# Patient Record
Sex: FEMALE | Race: WHITE | NOT HISPANIC OR LATINO | Employment: OTHER | URBAN - METROPOLITAN AREA
[De-identification: names, ages, dates, MRNs, and addresses within clinical notes are randomized per-mention and may not be internally consistent; named-entity substitution may affect disease eponyms.]

---

## 2019-10-03 ENCOUNTER — HOSPITAL ENCOUNTER (EMERGENCY)
Facility: MEDICAL CENTER | Age: 74
End: 2019-10-03
Attending: EMERGENCY MEDICINE
Payer: MEDICARE

## 2019-10-03 VITALS
DIASTOLIC BLOOD PRESSURE: 68 MMHG | HEART RATE: 64 BPM | WEIGHT: 209.88 LBS | TEMPERATURE: 98.2 F | OXYGEN SATURATION: 97 % | HEIGHT: 58 IN | RESPIRATION RATE: 17 BRPM | SYSTOLIC BLOOD PRESSURE: 169 MMHG | BODY MASS INDEX: 44.06 KG/M2

## 2019-10-03 DIAGNOSIS — R21 RASH: ICD-10-CM

## 2019-10-03 PROCEDURE — 700102 HCHG RX REV CODE 250 W/ 637 OVERRIDE(OP): Performed by: EMERGENCY MEDICINE

## 2019-10-03 PROCEDURE — A9270 NON-COVERED ITEM OR SERVICE: HCPCS | Performed by: EMERGENCY MEDICINE

## 2019-10-03 PROCEDURE — 99284 EMERGENCY DEPT VISIT MOD MDM: CPT

## 2019-10-03 RX ORDER — BENZOCAINE/MENTHOL 6 MG-10 MG
LOZENGE MUCOUS MEMBRANE ONCE
Status: COMPLETED | OUTPATIENT
Start: 2019-10-03 | End: 2019-10-03

## 2019-10-03 RX ORDER — SIMVASTATIN 10 MG
10 TABLET ORAL NIGHTLY
Status: SHIPPED | COMMUNITY
End: 2021-11-04 | Stop reason: SDUPTHER

## 2019-10-03 RX ORDER — BENZOCAINE/MENTHOL 6 MG-10 MG
1 LOZENGE MUCOUS MEMBRANE 2 TIMES DAILY
Qty: 1 TUBE | Refills: 0 | Status: SHIPPED | OUTPATIENT
Start: 2019-10-03 | End: 2023-09-24

## 2019-10-03 RX ADMIN — HYDROCORTISONE: 10 CREAM TOPICAL at 22:07

## 2019-10-04 NOTE — ED PROVIDER NOTES
ER Provider Note     Scribed for Louis Quezada M.D. by Laura French. 10/3/2019, 9:28 PM.    Primary Care Provider: James Oshea M.D.  Means of Arrival: Walk-in   History obtained from: Patient  History limited by: None     CHIEF COMPLAINT  Chief Complaint   Patient presents with   • Rash     patient ambulatory to triage states that 25 years ago she had a mastectomy, and lymphectomy, 15 years ago had lymphedema, tonight complains of redness to right arm and some swelling, some redness and swelling noted to area. States that it has some itching to area.        HPI  Madison Beth is a 74 y.o. female who presents to the Emergency Department complaining of right arm rash onset a couple hours ago. Patient states the area is itchy and warm and that she did not want to wake up with swelling the next morning, which prompted her to present to the ED tonight. She reports she had a single mastectomy 25 years ago and she had arm edema 15 years ago as a result; she does not want the same thing to happen again.     REVIEW OF SYSTEMS  See HPI for further details.    PAST MEDICAL HISTORY   has a past medical history of Cancer (HCC) and Hypertension.    SURGICAL HISTORY   has a past surgical history that includes primary c section; mastectomy; hysteroscopy with video operative (8/20/2010); and dilation and curettage (8/20/2010).    SOCIAL HISTORY  Social History     Tobacco Use   • Smoking status: Never Smoker   • Smokeless tobacco: Never Used   Substance Use Topics   • Alcohol use: No   • Drug use: No      Social History     Substance and Sexual Activity   Drug Use No       FAMILY HISTORY  History reviewed. No pertinent family history.    CURRENT MEDICATIONS  Home Medications     Reviewed by Katrina Moore R.N. (Registered Nurse) on 10/03/19 at 2054  Med List Status: Complete   Medication Last Dose Status   Metoprolol Tartrate (LOPRESSOR PO)  Active   Omeprazole (PRILOSEC PO)  Active   simvastatin (ZOCOR) 10 MG  "Tab  Active   triamterene-hctz (MAXZIDE-25/DYAZIDE) 37.5-25 MG TABS  Active                ALLERGIES  Allergies   Allergen Reactions   • Sulfa Drugs        PHYSICAL EXAM  VITAL SIGNS: BP (!) 169/68   Pulse 64   Temp 36.8 °C (98.2 °F) (Temporal)   Resp 17   Ht 1.473 m (4' 10\")   Wt 95.2 kg (209 lb 14.1 oz)   SpO2 97%   BMI 43.86 kg/m²      Constitutional: Alert in no apparent distress.  HENT: No signs of trauma, Bilateral external ears normal, Nose normal.   Eyes: Pupils are equal and reactive, Conjunctiva normal, Non-icteric.   Neck: Normal range of motion  Thorax & Lungs: No respiratory distress  Abdomen: Bowel sounds normal, Soft, No tenderness, No masses, No pulsatile masses. No peritoneal signs.  Skin: Warm, Dry, Right arm with maculopapular rash on dorsum of arm and with some red papules at bottom of arm with excoriation.   Back: No bony tenderness, No CVA tenderness.   Extremities: Intact distal pulses, No edema, No tenderness, No cyanosis.  Musculoskeletal: Good range of motion in all major joints. No major deformities noted.   Neurologic: Alert , Normal motor function, Normal sensory function, No focal deficits noted.   Psychiatric: Affect normal, Judgment normal, Mood normal.     COURSE & MEDICAL DECISION MAKING  Pertinent Labs & Imaging studies reviewed. (See chart for details)    This is a 74 y.o. female that presents for right arm rash onset a couple hours ago.  It appears to be a allergic rash.  There is no evidence of cellulitis.    9:28 PM - Patient seen and examined at bedside. Discussed with patient that I do not believe she has a bacterial infection but her symptoms are more allergy related due to itchiness. I will prescribe her hydrocortisol and give her first dose here. Discussed plan of discharge as outlined below and patient was given the opportunity to ask questions. Patient understands and is comfortable with plan.  Patient will be medicated with Hydrocortisone 1% cream for her " symptoms.     The patient will return for new or worsening symptoms and is stable at the time of discharge.    DISPOSITION:  Patient will be discharged home in stable condition.    FOLLOW UP:  James Oshea M.D.  [address]  [address]  [place] [id number]  [phone]    In 2 days      OUTPATIENT MEDICATIONS:  New Prescriptions    HYDROCORTISONE 1 % CREAM    Apply 1 g to affected area(s) 2 times a day.       FINAL IMPRESSION  1. Rash          Laura JOY (Scribe), am scribing for, and in the presence of, Louis Quezada M.D..    Electronically signed by: Laura French (Scribe), 10/3/2019    ILouis M.D. personally performed the services described in this documentation, as scribed by Laura French in my presence, and it is both accurate and complete.     E    The note accurately reflects work and decisions made by me.  Louis Quezada  10/3/2019  11:11 PM

## 2019-10-04 NOTE — ED NOTES
DC home with written and verbal instructions regarding f/u, activity and RX.  Verbalized understanding, ambulated out.

## 2019-10-04 NOTE — ED TRIAGE NOTES
"Chief Complaint   Patient presents with   • Rash     patient ambulatory to triage states that 25 years ago she had a mastectomy, and lymphectomy, 15 years ago had lymphedema, tonight complains of redness to right arm and some swelling, some redness and swelling noted to area. States that it has some itching to area.      BP (!) 169/68   Pulse 64   Temp 36.8 °C (98.2 °F) (Temporal)   Resp 17   Ht 1.473 m (4' 10\")   Wt 95.2 kg (209 lb 14.1 oz)   SpO2 97%     Patient educated on ed triage process, instructed to notify staff of any new or worsening symptoms. Apologized for wait times and placed back in ed lobby  "

## 2021-01-15 DIAGNOSIS — Z23 NEED FOR VACCINATION: ICD-10-CM

## 2021-11-04 NOTE — TELEPHONE ENCOUNTER
Received request via: Pharmacy    Was the patient seen in the last year in this department? No 11/2/2018 (confirmed in [facility])    Does the patient have an active prescription (recently filled or refills available) for medication(s) requested? No

## 2021-11-08 RX ORDER — SIMVASTATIN 10 MG
10 TABLET ORAL DAILY
Qty: 90 TABLET | Refills: 3 | Status: SHIPPED | OUTPATIENT
Start: 2021-11-08 | End: 2022-06-03 | Stop reason: SDUPTHER

## 2021-12-15 ENCOUNTER — TELEPHONE (OUTPATIENT)
Dept: MEDICAL GROUP | Facility: CLINIC | Age: 76
End: 2021-12-15

## 2021-12-16 NOTE — TELEPHONE ENCOUNTER
Received request via: Pharmacy    Was the patient seen in the last year in this department? No   Pt has not been seen since 11/2018.   Does the patient have an active prescription (recently filled or refills available) for medication(s) requested? No

## 2021-12-17 RX ORDER — TRIAMTERENE AND HYDROCHLOROTHIAZIDE 37.5; 25 MG/1; MG/1
1 TABLET ORAL DAILY
COMMUNITY
Start: 2015-02-03 | End: 2021-12-17 | Stop reason: SDUPTHER

## 2021-12-17 RX ORDER — TRIAMTERENE AND HYDROCHLOROTHIAZIDE 37.5; 25 MG/1; MG/1
1 TABLET ORAL DAILY
Qty: 90 TABLET | Refills: 3 | Status: SHIPPED | OUTPATIENT
Start: 2021-12-17 | End: 2021-12-20 | Stop reason: SDUPTHER

## 2021-12-22 RX ORDER — TRIAMTERENE AND HYDROCHLOROTHIAZIDE 37.5; 25 MG/1; MG/1
1 TABLET ORAL DAILY
Qty: 90 TABLET | Refills: 3 | Status: SHIPPED | OUTPATIENT
Start: 2021-12-22 | End: 2022-06-03 | Stop reason: SDUPTHER

## 2022-06-03 ENCOUNTER — OFFICE VISIT (OUTPATIENT)
Dept: MEDICAL GROUP | Facility: CLINIC | Age: 77
End: 2022-06-03
Payer: MEDICARE

## 2022-06-03 VITALS
SYSTOLIC BLOOD PRESSURE: 124 MMHG | WEIGHT: 192.7 LBS | HEART RATE: 60 BPM | OXYGEN SATURATION: 97 % | BODY MASS INDEX: 40.45 KG/M2 | DIASTOLIC BLOOD PRESSURE: 82 MMHG | HEIGHT: 58 IN | TEMPERATURE: 97.7 F

## 2022-06-03 DIAGNOSIS — Z00.00 ENCOUNTER FOR GENERAL ADULT MEDICAL EXAMINATION WITHOUT ABNORMAL FINDINGS: ICD-10-CM

## 2022-06-03 DIAGNOSIS — K21.9 GASTROESOPHAGEAL REFLUX DISEASE, UNSPECIFIED WHETHER ESOPHAGITIS PRESENT: ICD-10-CM

## 2022-06-03 DIAGNOSIS — I10 HYPERTENSION, UNSPECIFIED TYPE: ICD-10-CM

## 2022-06-03 DIAGNOSIS — N18.30 STAGE 3 CHRONIC KIDNEY DISEASE, UNSPECIFIED WHETHER STAGE 3A OR 3B CKD: ICD-10-CM

## 2022-06-03 PROCEDURE — 99214 OFFICE O/P EST MOD 30 MIN: CPT | Performed by: FAMILY MEDICINE

## 2022-06-03 RX ORDER — TRIAMTERENE AND HYDROCHLOROTHIAZIDE 37.5; 25 MG/1; MG/1
1 TABLET ORAL DAILY
Qty: 90 TABLET | Refills: 3 | Status: SHIPPED | OUTPATIENT
Start: 2022-06-03 | End: 2023-01-01

## 2022-06-03 RX ORDER — METOPROLOL TARTRATE 100 MG/1
100 TABLET ORAL 2 TIMES DAILY
Qty: 180 TABLET | Refills: 3 | Status: SHIPPED | OUTPATIENT
Start: 2022-06-03 | End: 2023-01-01

## 2022-06-03 RX ORDER — SIMVASTATIN 10 MG
10 TABLET ORAL DAILY
Qty: 90 TABLET | Refills: 3 | Status: SHIPPED | OUTPATIENT
Start: 2022-06-03 | End: 2023-01-01

## 2022-06-03 NOTE — ASSESSMENT & PLAN NOTE
We discussed a trial off of omeprazole by tapering down over the course of a week or 2.  If she has recurrent symptoms she may   continue the medication but I would like to know about a.  Otherwise we will be grateful if she can stop this medicine. Discussed risks of long term use.   Is not taking any NSAIDs, aspirin and no alcohol

## 2022-06-03 NOTE — ASSESSMENT & PLAN NOTE
I am recommending labs, but Madison refuses.  I will continue to monitor with her.  Currently she feels good and has no physical complaints.

## 2022-06-03 NOTE — PROGRESS NOTES
"Subjective:     CC: Med refills, HCM, HTN, GERD    HPI:   Madison presents today to discuss the following issues     Problem   Htn (Hypertension)    BP has been under good control. Needs med refills     Gastroesophageal Reflux Disease    Has no reflux symptoms at all now.  She has been taking over-the-counter 20 mg omeprazole for \"100 years\".  Wonders if that is okay to continue as is.     Stage 3 Chronic Kidney Disease (Hcc)    Is staying hydrated.   Refusing further lab work.     Encounter for General Adult Medical Examination Without Abnormal Findings    Does not want further colon cancer or breast cancer screening at this time.     Obesity       Current Outpatient Medications Ordered in Epic   Medication Sig Dispense Refill   • triamterene-hctz (MAXZIDE-25) 37.5-25 MG Tab Take 1 Tablet by mouth every day. 90 Tablet 3   • metoprolol tartrate (LOPRESSOR) 100 MG Tab Take 1 Tablet by mouth 2 times a day. 180 Tablet 3   • simvastatin (ZOCOR) 10 MG Tab Take 1 Tablet by mouth every day. 90 Tablet 3   • Omeprazole (PRILOSEC PO) Take  by mouth.     • hydrocortisone 1 % Cream Apply 1 g to affected area(s) 2 times a day. (Patient not taking: Reported on 6/3/2022) 1 Tube 0     No current Epic-ordered facility-administered medications on file.       Health Maintenance:     ROS:  Gen: no fevers/chills, no changes in weight  Eyes: no changes in vision  ENT: no sore throat, no hearing loss, no bloody nose  Pulm: no sob, no cough  CV: no chest pain, no palpitations  GI: no nausea/vomiting, no diarrhea  : no dysuria  MSk: no myalgias  Skin: no rash  Neuro: no headaches, no numbness/tingling  Heme/Lymph: no easy bruising      Objective:     Exam:  /82 (BP Location: Left arm, Patient Position: Sitting, BP Cuff Size: Adult)   Pulse 60   Temp 36.5 °C (97.7 °F) (Temporal)   Ht 1.473 m (4' 10\")   Wt 87.4 kg (192 lb 11.2 oz)   SpO2 97%   BMI 40.27 kg/m²  Body mass index is 40.27 kg/m².    Gen: Alert and oriented, No apparent " distress.  Neck: Neck is supple without lymphadenopathy.  Lungs: Normal effort, CTA bilaterally, no wheezes, rhonchi, or rales  CV: Regular rate and rhythm. No murmurs, rubs, or gallops.  Ext: No clubbing, cyanosis, edema.          Assessment & Plan:     76 y.o. female with the following -     Problem List Items Addressed This Visit     Encounter for general adult medical examination without abnormal findings     I reviewed pros and cons of the recommended screening evaluations.  Madison politely declines colonoscopy, mammography, DEXA scanning and Shingrix.  She has promised me that she will give it deep thought and get back to me if she changes her mind.           HTN (hypertension)     RF Meds  Of note, she has fairly bradycardic on the metoprolol.  She insists that she is asymptomatic and given that she has been on this medicine with good blood pressure control for so long I will continue to refill.  She is aware of potential issues with this though and will let me know if there are any problems.           Relevant Medications    triamterene-hctz (MAXZIDE-25) 37.5-25 MG Tab    metoprolol tartrate (LOPRESSOR) 100 MG Tab    simvastatin (ZOCOR) 10 MG Tab    Gastroesophageal reflux disease     We discussed a trial off of omeprazole by tapering down over the course of a week or 2.  If she has recurrent symptoms she may   continue the medication but I would like to know about a.  Otherwise we will be grateful if she can stop this medicine. Discussed risks of long term use.   Is not taking any NSAIDs, aspirin and no alcohol           Stage 3 chronic kidney disease (HCC)     I am recommending labs, but Madison refuses.  I will continue to monitor with her.  Currently she feels good and has no physical complaints.                   No follow-ups on file.

## 2022-06-03 NOTE — ASSESSMENT & PLAN NOTE
I reviewed pros and cons of the recommended screening evaluations.  Madison politely declines colonoscopy, mammography, DEXA scanning and Shingrix.  She has promised me that she will give it deep thought and get back to me if she changes her mind.

## 2022-06-03 NOTE — ASSESSMENT & PLAN NOTE
RF Meds  Of note, she has fairly bradycardic on the metoprolol.  She insists that she is asymptomatic and given that she has been on this medicine with good blood pressure control for so long I will continue to refill.  She is aware of potential issues with this though and will let me know if there are any problems.

## 2023-01-01 ENCOUNTER — APPOINTMENT (OUTPATIENT)
Dept: RADIOLOGY | Facility: MEDICAL CENTER | Age: 78
DRG: 217 | End: 2023-01-01
Attending: INTERNAL MEDICINE
Payer: MEDICARE

## 2023-01-01 ENCOUNTER — APPOINTMENT (OUTPATIENT)
Dept: CARDIOLOGY | Facility: MEDICAL CENTER | Age: 78
DRG: 217 | End: 2023-01-01
Attending: EMERGENCY MEDICINE
Payer: MEDICARE

## 2023-01-01 ENCOUNTER — APPOINTMENT (OUTPATIENT)
Dept: CARDIOLOGY | Facility: MEDICAL CENTER | Age: 78
DRG: 217 | End: 2023-01-01
Attending: INTERNAL MEDICINE
Payer: MEDICARE

## 2023-01-01 ENCOUNTER — OFFICE VISIT (OUTPATIENT)
Dept: MEDICAL GROUP | Facility: CLINIC | Age: 78
End: 2023-01-01
Payer: MEDICARE

## 2023-01-01 ENCOUNTER — APPOINTMENT (OUTPATIENT)
Dept: RADIOLOGY | Facility: MEDICAL CENTER | Age: 78
DRG: 217 | End: 2023-01-01
Payer: MEDICARE

## 2023-01-01 ENCOUNTER — HOSPITAL ENCOUNTER (INPATIENT)
Facility: MEDICAL CENTER | Age: 78
LOS: 2 days | DRG: 217 | End: 2023-09-23
Attending: EMERGENCY MEDICINE | Admitting: INTERNAL MEDICINE
Payer: MEDICARE

## 2023-01-01 ENCOUNTER — HOSPITAL ENCOUNTER (OUTPATIENT)
Dept: RADIOLOGY | Facility: MEDICAL CENTER | Age: 78
End: 2023-09-22
Attending: INTERNAL MEDICINE

## 2023-01-01 VITALS — HEART RATE: 54 BPM | OXYGEN SATURATION: 96 % | DIASTOLIC BLOOD PRESSURE: 75 MMHG | SYSTOLIC BLOOD PRESSURE: 138 MMHG

## 2023-01-01 VITALS
HEART RATE: 73 BPM | SYSTOLIC BLOOD PRESSURE: 96 MMHG | BODY MASS INDEX: 40.77 KG/M2 | DIASTOLIC BLOOD PRESSURE: 54 MMHG | WEIGHT: 207.67 LBS | OXYGEN SATURATION: 91 % | HEIGHT: 60 IN | TEMPERATURE: 98.8 F | RESPIRATION RATE: 28 BRPM

## 2023-01-01 DIAGNOSIS — I21.02 ST ELEVATION MYOCARDIAL INFARCTION INVOLVING LEFT ANTERIOR DESCENDING (LAD) CORONARY ARTERY (HCC): ICD-10-CM

## 2023-01-01 DIAGNOSIS — I21.3 ST ELEVATION MYOCARDIAL INFARCTION (STEMI), UNSPECIFIED ARTERY (HCC): ICD-10-CM

## 2023-01-01 DIAGNOSIS — L98.9 SKIN LESION: ICD-10-CM

## 2023-01-01 DIAGNOSIS — I10 HYPERTENSION, UNSPECIFIED TYPE: ICD-10-CM

## 2023-01-01 DIAGNOSIS — K21.9 GASTROESOPHAGEAL REFLUX DISEASE, UNSPECIFIED WHETHER ESOPHAGITIS PRESENT: ICD-10-CM

## 2023-01-01 LAB
ABO + RH BLD: NORMAL
ABO GROUP BLD: NORMAL
ACT BLD: 125 SEC (ref 74–137)
ACT BLD: 137 SEC (ref 74–137)
ACT BLD: 143 SEC (ref 74–137)
ACT BLD: 143 SEC (ref 74–137)
ACT BLD: 149 SEC (ref 74–137)
ACT BLD: 155 SEC (ref 74–137)
ACT BLD: 161 SEC (ref 74–137)
ACT BLD: 167 SEC (ref 74–137)
ACT BLD: 167 SEC (ref 74–137)
ACT BLD: 203 SEC (ref 74–137)
ACT BLD: 239 SEC (ref 74–137)
ACT BLD: 245 SEC (ref 74–137)
ACT BLD: 287 SEC (ref 74–137)
ACT BLD: 305 SEC (ref 74–137)
ACT BLD: 323 SEC (ref 74–137)
ALBUMIN SERPL BCP-MCNC: 2.9 G/DL (ref 3.2–4.9)
ALBUMIN SERPL BCP-MCNC: 3.2 G/DL (ref 3.2–4.9)
ALBUMIN SERPL BCP-MCNC: 3.9 G/DL (ref 3.2–4.9)
ALBUMIN/GLOB SERPL: 1.1 G/DL
ALP SERPL-CCNC: 114 U/L (ref 30–99)
ALP SERPL-CCNC: 144 U/L (ref 30–99)
ALP SERPL-CCNC: 85 U/L (ref 30–99)
ALT SERPL-CCNC: 58 U/L (ref 2–50)
ALT SERPL-CCNC: 68 U/L (ref 2–50)
ALT SERPL-CCNC: 70 U/L (ref 2–50)
AMORPH CRY #/AREA URNS HPF: PRESENT /HPF
ANION GAP SERPL CALC-SCNC: 15 MMOL/L (ref 7–16)
ANION GAP SERPL CALC-SCNC: 16 MMOL/L (ref 7–16)
ANION GAP SERPL CALC-SCNC: 18 MMOL/L (ref 7–16)
ANION GAP SERPL CALC-SCNC: 18 MMOL/L (ref 7–16)
ANION GAP SERPL CALC-SCNC: 19 MMOL/L (ref 7–16)
ANION GAP SERPL CALC-SCNC: 20 MMOL/L (ref 7–16)
ANION GAP SERPL CALC-SCNC: 21 MMOL/L (ref 7–16)
APPEARANCE UR: ABNORMAL
AST SERPL-CCNC: 130 U/L (ref 12–45)
AST SERPL-CCNC: 207 U/L (ref 12–45)
AST SERPL-CCNC: 66 U/L (ref 12–45)
BACTERIA #/AREA URNS HPF: NEGATIVE /HPF
BARCODED ABORH UBTYP: 5100
BARCODED PRD CODE UBPRD: NORMAL
BARCODED UNIT NUM UBUNT: NORMAL
BASE EXCESS BLDA CALC-SCNC: -13 MMOL/L (ref -4–3)
BASE EXCESS BLDA CALC-SCNC: -4 MMOL/L (ref -4–3)
BASE EXCESS BLDA CALC-SCNC: -5 MMOL/L (ref -4–3)
BASE EXCESS BLDA CALC-SCNC: -5 MMOL/L (ref -4–3)
BASE EXCESS BLDA CALC-SCNC: -6 MMOL/L (ref -4–3)
BASE EXCESS BLDV CALC-SCNC: -4 MMOL/L (ref -4–3)
BASOPHILS # BLD AUTO: 0 % (ref 0–1.8)
BASOPHILS # BLD: 0 K/UL (ref 0–0.12)
BILIRUB CONJ SERPL-MCNC: 0.4 MG/DL (ref 0.1–0.5)
BILIRUB CONJ SERPL-MCNC: <0.2 MG/DL (ref 0.1–0.5)
BILIRUB INDIRECT SERPL-MCNC: 1.5 MG/DL (ref 0–1)
BILIRUB INDIRECT SERPL-MCNC: NORMAL MG/DL (ref 0–1)
BILIRUB SERPL-MCNC: 0.9 MG/DL (ref 0.1–1.5)
BILIRUB SERPL-MCNC: 1 MG/DL (ref 0.1–1.5)
BILIRUB SERPL-MCNC: 1 MG/DL (ref 0.1–1.5)
BILIRUB SERPL-MCNC: 1.9 MG/DL (ref 0.1–1.5)
BILIRUB UR QL STRIP.AUTO: ABNORMAL
BLD GP AB SCN SERPL QL: NORMAL
BODY TEMPERATURE: ABNORMAL DEGREES
BUN SERPL-MCNC: 32 MG/DL (ref 8–22)
BUN SERPL-MCNC: 35 MG/DL (ref 8–22)
BUN SERPL-MCNC: 37 MG/DL (ref 8–22)
BUN SERPL-MCNC: 42 MG/DL (ref 8–22)
BUN SERPL-MCNC: 43 MG/DL (ref 8–22)
BUN SERPL-MCNC: 45 MG/DL (ref 8–22)
BUN SERPL-MCNC: 46 MG/DL (ref 8–22)
CA-I BLD ISE-SCNC: 1.19 MMOL/L (ref 1.1–1.3)
CALCIUM ALBUM COR SERPL-MCNC: 8.6 MG/DL (ref 8.5–10.5)
CALCIUM ALBUM COR SERPL-MCNC: 8.9 MG/DL (ref 8.5–10.5)
CALCIUM ALBUM COR SERPL-MCNC: 9.5 MG/DL (ref 8.5–10.5)
CALCIUM SERPL-MCNC: 7.9 MG/DL (ref 8.5–10.5)
CALCIUM SERPL-MCNC: 7.9 MG/DL (ref 8.5–10.5)
CALCIUM SERPL-MCNC: 8 MG/DL (ref 8.5–10.5)
CALCIUM SERPL-MCNC: 8.3 MG/DL (ref 8.5–10.5)
CALCIUM SERPL-MCNC: 9.4 MG/DL (ref 8.5–10.5)
CHLORIDE SERPL-SCNC: 100 MMOL/L (ref 96–112)
CHLORIDE SERPL-SCNC: 100 MMOL/L (ref 96–112)
CHLORIDE SERPL-SCNC: 96 MMOL/L (ref 96–112)
CHLORIDE SERPL-SCNC: 97 MMOL/L (ref 96–112)
CHLORIDE SERPL-SCNC: 97 MMOL/L (ref 96–112)
CHLORIDE SERPL-SCNC: 98 MMOL/L (ref 96–112)
CHLORIDE SERPL-SCNC: 99 MMOL/L (ref 96–112)
CO2 BLDA-SCNC: 16 MMOL/L (ref 20–33)
CO2 BLDA-SCNC: 18 MMOL/L (ref 20–33)
CO2 BLDA-SCNC: 18 MMOL/L (ref 20–33)
CO2 BLDA-SCNC: 19 MMOL/L (ref 20–33)
CO2 BLDA-SCNC: 20 MMOL/L (ref 20–33)
CO2 BLDV-SCNC: 21 MMOL/L (ref 20–33)
CO2 SERPL-SCNC: 15 MMOL/L (ref 20–33)
CO2 SERPL-SCNC: 16 MMOL/L (ref 20–33)
CO2 SERPL-SCNC: 20 MMOL/L (ref 20–33)
COLOR UR: ABNORMAL
COMPONENT R 8504R: NORMAL
CREAT SERPL-MCNC: 1.62 MG/DL (ref 0.5–1.4)
CREAT SERPL-MCNC: 1.75 MG/DL (ref 0.5–1.4)
CREAT SERPL-MCNC: 1.94 MG/DL (ref 0.5–1.4)
CREAT SERPL-MCNC: 1.97 MG/DL (ref 0.5–1.4)
CREAT SERPL-MCNC: 2.4 MG/DL (ref 0.5–1.4)
CREAT SERPL-MCNC: 2.4 MG/DL (ref 0.5–1.4)
CREAT SERPL-MCNC: 2.52 MG/DL (ref 0.5–1.4)
DELSYS IDSYS: ABNORMAL
EKG IMPRESSION: NORMAL
EKG IMPRESSION: NORMAL
EOSINOPHIL # BLD AUTO: 0 K/UL (ref 0–0.51)
EOSINOPHIL NFR BLD: 0 % (ref 0–6.9)
EPI CELLS #/AREA URNS HPF: ABNORMAL /HPF
ERYTHROCYTE [DISTWIDTH] IN BLOOD BY AUTOMATED COUNT: 46.5 FL (ref 35.9–50)
ERYTHROCYTE [DISTWIDTH] IN BLOOD BY AUTOMATED COUNT: 46.6 FL (ref 35.9–50)
ERYTHROCYTE [DISTWIDTH] IN BLOOD BY AUTOMATED COUNT: 46.8 FL (ref 35.9–50)
ERYTHROCYTE [DISTWIDTH] IN BLOOD BY AUTOMATED COUNT: 46.8 FL (ref 35.9–50)
ERYTHROCYTE [DISTWIDTH] IN BLOOD BY AUTOMATED COUNT: 47.5 FL (ref 35.9–50)
ERYTHROCYTE [DISTWIDTH] IN BLOOD BY AUTOMATED COUNT: 49 FL (ref 35.9–50)
FIBRINOGEN PPP-MCNC: 389 MG/DL (ref 215–460)
GFR SERPLBLD CREATININE-BSD FMLA CKD-EPI: 19 ML/MIN/1.73 M 2
GFR SERPLBLD CREATININE-BSD FMLA CKD-EPI: 20 ML/MIN/1.73 M 2
GFR SERPLBLD CREATININE-BSD FMLA CKD-EPI: 20 ML/MIN/1.73 M 2
GFR SERPLBLD CREATININE-BSD FMLA CKD-EPI: 25 ML/MIN/1.73 M 2
GFR SERPLBLD CREATININE-BSD FMLA CKD-EPI: 26 ML/MIN/1.73 M 2
GFR SERPLBLD CREATININE-BSD FMLA CKD-EPI: 29 ML/MIN/1.73 M 2
GFR SERPLBLD CREATININE-BSD FMLA CKD-EPI: 32 ML/MIN/1.73 M 2
GLOBULIN SER CALC-MCNC: 2.6 G/DL (ref 1.9–3.5)
GLOBULIN SER CALC-MCNC: 3 G/DL (ref 1.9–3.5)
GLOBULIN SER CALC-MCNC: 3.5 G/DL (ref 1.9–3.5)
GLUCOSE BLD STRIP.AUTO-MCNC: 200 MG/DL (ref 65–99)
GLUCOSE BLD STRIP.AUTO-MCNC: 214 MG/DL (ref 65–99)
GLUCOSE BLD STRIP.AUTO-MCNC: 243 MG/DL (ref 65–99)
GLUCOSE BLD STRIP.AUTO-MCNC: 288 MG/DL (ref 65–99)
GLUCOSE BLD STRIP.AUTO-MCNC: 300 MG/DL (ref 65–99)
GLUCOSE SERPL-MCNC: 200 MG/DL (ref 65–99)
GLUCOSE SERPL-MCNC: 217 MG/DL (ref 65–99)
GLUCOSE SERPL-MCNC: 222 MG/DL (ref 65–99)
GLUCOSE SERPL-MCNC: 230 MG/DL (ref 65–99)
GLUCOSE SERPL-MCNC: 239 MG/DL (ref 65–99)
GLUCOSE SERPL-MCNC: 255 MG/DL (ref 65–99)
GLUCOSE SERPL-MCNC: 272 MG/DL (ref 65–99)
GLUCOSE UR STRIP.AUTO-MCNC: 100 MG/DL
GRAN CASTS #/AREA URNS LPF: ABNORMAL /LPF
HCO3 BLDA-SCNC: 14.7 MMOL/L (ref 17–25)
HCO3 BLDA-SCNC: 17.2 MMOL/L (ref 17–25)
HCO3 BLDA-SCNC: 17.5 MMOL/L (ref 17–25)
HCO3 BLDA-SCNC: 18.4 MMOL/L (ref 17–25)
HCO3 BLDA-SCNC: 18.9 MMOL/L (ref 17–25)
HCO3 BLDV-SCNC: 20.1 MMOL/L (ref 24–28)
HCT VFR BLD AUTO: 24.9 % (ref 37–47)
HCT VFR BLD AUTO: 25.1 % (ref 37–47)
HCT VFR BLD AUTO: 27 % (ref 37–47)
HCT VFR BLD AUTO: 27.8 % (ref 37–47)
HCT VFR BLD AUTO: 31 % (ref 37–47)
HCT VFR BLD AUTO: 33.8 % (ref 37–47)
HCT VFR BLD AUTO: 38 % (ref 37–47)
HCT VFR BLD AUTO: 44.4 % (ref 37–47)
HGB BLD-MCNC: 10.3 G/DL (ref 12–16)
HGB BLD-MCNC: 11.1 G/DL (ref 12–16)
HGB BLD-MCNC: 12.4 G/DL (ref 12–16)
HGB BLD-MCNC: 14.3 G/DL (ref 12–16)
HGB BLD-MCNC: 8 G/DL (ref 12–16)
HGB BLD-MCNC: 8.3 G/DL (ref 12–16)
HGB BLD-MCNC: 9.2 G/DL (ref 12–16)
HGB BLD-MCNC: 9.5 G/DL (ref 12–16)
HOROWITZ INDEX BLDA+IHG-RTO: 243 MM[HG]
HOROWITZ INDEX BLDA+IHG-RTO: 278 MM[HG]
INR PPP: 1.37 (ref 0.87–1.13)
INR PPP: 1.68 (ref 0.87–1.13)
KETONES UR STRIP.AUTO-MCNC: NEGATIVE MG/DL
LACTATE BLD-SCNC: 3.6 MMOL/L (ref 0.5–2)
LACTATE SERPL-SCNC: 2.4 MMOL/L (ref 0.5–2)
LACTATE SERPL-SCNC: 2.6 MMOL/L (ref 0.5–2)
LACTATE SERPL-SCNC: 2.8 MMOL/L (ref 0.5–2)
LACTATE SERPL-SCNC: 3.2 MMOL/L (ref 0.5–2)
LACTATE SERPL-SCNC: 3.5 MMOL/L (ref 0.5–2)
LACTATE SERPL-SCNC: 4.1 MMOL/L (ref 0.5–2)
LACTATE SERPL-SCNC: 5 MMOL/L (ref 0.5–2)
LACTATE SERPL-SCNC: 5.2 MMOL/L (ref 0.5–2)
LACTATE SERPL-SCNC: 8 MMOL/L (ref 0.5–2)
LDH SERPL L TO P-CCNC: 741 U/L (ref 107–266)
LEUKOCYTE ESTERASE UR QL STRIP.AUTO: NEGATIVE
LPM ILPM: 1 LPM
LPM ILPM: 3 LPM
LV EJECT FRACT  99904: 20
LYMPHOCYTES # BLD AUTO: 1.85 K/UL (ref 1–4.8)
LYMPHOCYTES NFR BLD: 10.4 % (ref 22–41)
MAGNESIUM SERPL-MCNC: 1.6 MG/DL (ref 1.5–2.5)
MAGNESIUM SERPL-MCNC: 2 MG/DL (ref 1.5–2.5)
MAGNESIUM SERPL-MCNC: 2.3 MG/DL (ref 1.5–2.5)
MANUAL DIFF BLD: NORMAL
MCH RBC QN AUTO: 27.3 PG (ref 27–33)
MCH RBC QN AUTO: 27.9 PG (ref 27–33)
MCH RBC QN AUTO: 28 PG (ref 27–33)
MCH RBC QN AUTO: 28.1 PG (ref 27–33)
MCH RBC QN AUTO: 28.1 PG (ref 27–33)
MCH RBC QN AUTO: 29.1 PG (ref 27–33)
MCHC RBC AUTO-ENTMCNC: 32.1 G/DL (ref 32.2–35.5)
MCHC RBC AUTO-ENTMCNC: 32.2 G/DL (ref 32.2–35.5)
MCHC RBC AUTO-ENTMCNC: 32.6 G/DL (ref 32.2–35.5)
MCHC RBC AUTO-ENTMCNC: 32.8 G/DL (ref 32.2–35.5)
MCHC RBC AUTO-ENTMCNC: 33.1 G/DL (ref 32.2–35.5)
MCHC RBC AUTO-ENTMCNC: 34.2 G/DL (ref 32.2–35.5)
MCV RBC AUTO: 85 FL (ref 81.4–97.8)
MCV RBC AUTO: 85 FL (ref 81.4–97.8)
MCV RBC AUTO: 85.1 FL (ref 81.4–97.8)
MCV RBC AUTO: 85.1 FL (ref 81.4–97.8)
MCV RBC AUTO: 85.4 FL (ref 81.4–97.8)
MCV RBC AUTO: 87.4 FL (ref 81.4–97.8)
METAMYELOCYTES NFR BLD MANUAL: 0.9 %
MICRO URNS: ABNORMAL
MONOCYTES # BLD AUTO: 1.09 K/UL (ref 0–0.85)
MONOCYTES NFR BLD AUTO: 6.1 % (ref 0–13.4)
MORPHOLOGY BLD-IMP: NORMAL
NEUTROPHILS # BLD AUTO: 14.7 K/UL (ref 1.82–7.42)
NEUTROPHILS NFR BLD: 81.7 % (ref 44–72)
NEUTS BAND NFR BLD MANUAL: 0.9 % (ref 0–10)
NITRITE UR QL STRIP.AUTO: POSITIVE
NRBC # BLD AUTO: 0.02 K/UL
NRBC BLD-RTO: 0.1 /100 WBC (ref 0–0.2)
NT-PROBNP SERPL IA-MCNC: ABNORMAL PG/ML (ref 0–125)
NT-PROBNP SERPL IA-MCNC: ABNORMAL PG/ML (ref 0–125)
O2/TOTAL GAS SETTING VFR VENT: 28 %
O2/TOTAL GAS SETTING VFR VENT: 32 %
PCO2 BLDA: 24 MMHG (ref 26–37)
PCO2 BLDA: 26.5 MMHG (ref 26–37)
PCO2 BLDA: 29.3 MMHG (ref 26–37)
PCO2 BLDA: 29.9 MMHG (ref 26–37)
PCO2 BLDA: 40.7 MMHG (ref 26–37)
PCO2 BLDV: 34.6 MMHG (ref 41–51)
PCO2 TEMP ADJ BLDA: 24 MMHG (ref 26–37)
PCO2 TEMP ADJ BLDA: 26.4 MMHG (ref 26–37)
PCO2 TEMP ADJ BLDA: 29.8 MMHG (ref 26–37)
PCO2 TEMP ADJ BLDA: 30.4 MMHG (ref 26–37)
PCO2 TEMP ADJ BLDV: 34.5 MMHG (ref 41–51)
PH BLDA: 7.17 [PH] (ref 7.4–7.5)
PH BLDA: 7.41 [PH] (ref 7.4–7.5)
PH BLDA: 7.41 [PH] (ref 7.4–7.5)
PH BLDA: 7.43 [PH] (ref 7.4–7.5)
PH BLDA: 7.46 [PH] (ref 7.4–7.5)
PH BLDV: 7.37 [PH] (ref 7.31–7.45)
PH TEMP ADJ BLDA: 7.4 [PH] (ref 7.4–7.5)
PH TEMP ADJ BLDA: 7.4 [PH] (ref 7.4–7.5)
PH TEMP ADJ BLDA: 7.43 [PH] (ref 7.4–7.5)
PH TEMP ADJ BLDA: 7.46 [PH] (ref 7.4–7.5)
PH TEMP ADJ BLDV: 7.37 [PH] (ref 7.31–7.45)
PH UR STRIP.AUTO: 5.5 [PH] (ref 5–8)
PLATELET # BLD AUTO: 141 K/UL (ref 164–446)
PLATELET # BLD AUTO: 152 K/UL (ref 164–446)
PLATELET # BLD AUTO: 161 K/UL (ref 164–446)
PLATELET # BLD AUTO: 244 K/UL (ref 164–446)
PLATELET # BLD AUTO: 249 K/UL (ref 164–446)
PLATELET # BLD AUTO: 301 K/UL (ref 164–446)
PLATELET BLD QL SMEAR: NORMAL
PMV BLD AUTO: 10.6 FL (ref 9–12.9)
PMV BLD AUTO: 10.8 FL (ref 9–12.9)
PMV BLD AUTO: 11 FL (ref 9–12.9)
PMV BLD AUTO: 11 FL (ref 9–12.9)
PMV BLD AUTO: 11.1 FL (ref 9–12.9)
PMV BLD AUTO: 11.4 FL (ref 9–12.9)
PO2 BLDA: 119 MMHG (ref 64–87)
PO2 BLDA: 68 MMHG (ref 64–87)
PO2 BLDA: 76 MMHG (ref 64–87)
PO2 BLDA: 87 MMHG (ref 64–87)
PO2 BLDA: 89 MMHG (ref 64–87)
PO2 BLDV: 28 MMHG (ref 25–40)
PO2 TEMP ADJ BLDA: 70 MMHG (ref 64–87)
PO2 TEMP ADJ BLDA: 76 MMHG (ref 64–87)
PO2 TEMP ADJ BLDA: 87 MMHG (ref 64–87)
PO2 TEMP ADJ BLDA: 91 MMHG (ref 64–87)
PO2 TEMP ADJ BLDV: 27 MMHG (ref 25–40)
POTASSIUM BLD-SCNC: 4.3 MMOL/L (ref 3.6–5.5)
POTASSIUM SERPL-SCNC: 3.8 MMOL/L (ref 3.6–5.5)
POTASSIUM SERPL-SCNC: 3.9 MMOL/L (ref 3.6–5.5)
POTASSIUM SERPL-SCNC: 4.1 MMOL/L (ref 3.6–5.5)
POTASSIUM SERPL-SCNC: 4.3 MMOL/L (ref 3.6–5.5)
POTASSIUM SERPL-SCNC: 4.5 MMOL/L (ref 3.6–5.5)
PROCALCITONIN SERPL-MCNC: 0.32 NG/ML
PRODUCT TYPE UPROD: NORMAL
PROT SERPL-MCNC: 5.5 G/DL (ref 6–8.2)
PROT SERPL-MCNC: 6.2 G/DL (ref 6–8.2)
PROT SERPL-MCNC: 7.4 G/DL (ref 6–8.2)
PROT UR QL STRIP: 100 MG/DL
PROTHROMBIN TIME: 17 SEC (ref 12–14.6)
PROTHROMBIN TIME: 20 SEC (ref 12–14.6)
RBC # BLD AUTO: 2.93 M/UL (ref 4.2–5.4)
RBC # BLD AUTO: 2.95 M/UL (ref 4.2–5.4)
RBC # BLD AUTO: 3.27 M/UL (ref 4.2–5.4)
RBC # BLD AUTO: 3.97 M/UL (ref 4.2–5.4)
RBC # BLD AUTO: 4.45 M/UL (ref 4.2–5.4)
RBC # BLD AUTO: 5.08 M/UL (ref 4.2–5.4)
RBC # URNS HPF: ABNORMAL /HPF
RBC BLD AUTO: NORMAL
RBC UR QL AUTO: ABNORMAL
RH BLD: NORMAL
SAO2 % BLDA: 94 % (ref 93–99)
SAO2 % BLDA: 96 % (ref 93–99)
SAO2 % BLDA: 97 % (ref 93–99)
SAO2 % BLDV: 51 %
SODIUM BLD-SCNC: 134 MMOL/L (ref 135–145)
SODIUM SERPL-SCNC: 130 MMOL/L (ref 135–145)
SODIUM SERPL-SCNC: 130 MMOL/L (ref 135–145)
SODIUM SERPL-SCNC: 131 MMOL/L (ref 135–145)
SODIUM SERPL-SCNC: 133 MMOL/L (ref 135–145)
SODIUM SERPL-SCNC: 133 MMOL/L (ref 135–145)
SODIUM SERPL-SCNC: 135 MMOL/L (ref 135–145)
SODIUM SERPL-SCNC: 135 MMOL/L (ref 135–145)
SP GR UR STRIP.AUTO: 1.02
SPECIMEN DRAWN FROM PATIENT: ABNORMAL
TRANS CELLS #/AREA URNS HPF: ABNORMAL /HPF
TROPONIN T SERPL-MCNC: 725 NG/L (ref 6–19)
UNIT STATUS USTAT: NORMAL
UROBILINOGEN UR STRIP.AUTO-MCNC: 0.2 MG/DL
WBC # BLD AUTO: 14.3 K/UL (ref 4.8–10.8)
WBC # BLD AUTO: 16 K/UL (ref 4.8–10.8)
WBC # BLD AUTO: 17.6 K/UL (ref 4.8–10.8)
WBC # BLD AUTO: 17.8 K/UL (ref 4.8–10.8)
WBC # BLD AUTO: 19.5 K/UL (ref 4.8–10.8)
WBC # BLD AUTO: 21.2 K/UL (ref 4.8–10.8)
WBC #/AREA URNS HPF: ABNORMAL /HPF

## 2023-01-01 PROCEDURE — 30233N1 TRANSFUSION OF NONAUTOLOGOUS RED BLOOD CELLS INTO PERIPHERAL VEIN, PERCUTANEOUS APPROACH: ICD-10-PCS | Performed by: INTERNAL MEDICINE

## 2023-01-01 PROCEDURE — 83605 ASSAY OF LACTIC ACID: CPT | Mod: 91

## 2023-01-01 PROCEDURE — 99291 CRITICAL CARE FIRST HOUR: CPT | Performed by: INTERNAL MEDICINE

## 2023-01-01 PROCEDURE — 80048 BASIC METABOLIC PNL TOTAL CA: CPT

## 2023-01-01 PROCEDURE — 74176 CT ABD & PELVIS W/O CONTRAST: CPT

## 2023-01-01 PROCEDURE — 92978 ENDOLUMINL IVUS OCT C 1ST: CPT | Mod: 26,LD | Performed by: INTERNAL MEDICINE

## 2023-01-01 PROCEDURE — 3078F DIAST BP <80 MM HG: CPT | Performed by: STUDENT IN AN ORGANIZED HEALTH CARE EDUCATION/TRAINING PROGRAM

## 2023-01-01 PROCEDURE — 82803 BLOOD GASES ANY COMBINATION: CPT

## 2023-01-01 PROCEDURE — 36415 COLL VENOUS BLD VENIPUNCTURE: CPT

## 2023-01-01 PROCEDURE — 85347 COAGULATION TIME ACTIVATED: CPT | Mod: 91

## 2023-01-01 PROCEDURE — 700105 HCHG RX REV CODE 258: Performed by: INTERNAL MEDICINE

## 2023-01-01 PROCEDURE — 36620 INSERTION CATHETER ARTERY: CPT | Performed by: INTERNAL MEDICINE

## 2023-01-01 PROCEDURE — B241ZZ3 ULTRASONOGRAPHY OF MULTIPLE CORONARY ARTERIES, INTRAVASCULAR: ICD-10-PCS | Performed by: INTERNAL MEDICINE

## 2023-01-01 PROCEDURE — 85384 FIBRINOGEN ACTIVITY: CPT

## 2023-01-01 PROCEDURE — 85025 COMPLETE CBC W/AUTO DIFF WBC: CPT

## 2023-01-01 PROCEDURE — 93005 ELECTROCARDIOGRAM TRACING: CPT | Performed by: INTERNAL MEDICINE

## 2023-01-01 PROCEDURE — 99291 CRITICAL CARE FIRST HOUR: CPT | Mod: 25 | Performed by: INTERNAL MEDICINE

## 2023-01-01 PROCEDURE — 33993 REPOSG PERQ R/L HRT VAD: CPT

## 2023-01-01 PROCEDURE — 86900 BLOOD TYPING SEROLOGIC ABO: CPT

## 2023-01-01 PROCEDURE — 71045 X-RAY EXAM CHEST 1 VIEW: CPT

## 2023-01-01 PROCEDURE — 93308 TTE F-UP OR LMTD: CPT

## 2023-01-01 PROCEDURE — 83735 ASSAY OF MAGNESIUM: CPT

## 2023-01-01 PROCEDURE — 700105 HCHG RX REV CODE 258

## 2023-01-01 PROCEDURE — 02HA3RJ INSERTION OF SHORT-TERM EXTERNAL HEART ASSIST SYSTEM INTO HEART, INTRAOPERATIVE, PERCUTANEOUS APPROACH: ICD-10-PCS | Performed by: INTERNAL MEDICINE

## 2023-01-01 PROCEDURE — 93460 R&L HRT ART/VENTRICLE ANGIO: CPT | Mod: 26,59 | Performed by: INTERNAL MEDICINE

## 2023-01-01 PROCEDURE — 86901 BLOOD TYPING SEROLOGIC RH(D): CPT

## 2023-01-01 PROCEDURE — 027034Z DILATION OF CORONARY ARTERY, ONE ARTERY WITH DRUG-ELUTING INTRALUMINAL DEVICE, PERCUTANEOUS APPROACH: ICD-10-PCS | Performed by: INTERNAL MEDICINE

## 2023-01-01 PROCEDURE — 700111 HCHG RX REV CODE 636 W/ 250 OVERRIDE (IP): Mod: JZ | Performed by: STUDENT IN AN ORGANIZED HEALTH CARE EDUCATION/TRAINING PROGRAM

## 2023-01-01 PROCEDURE — 700102 HCHG RX REV CODE 250 W/ 637 OVERRIDE(OP)

## 2023-01-01 PROCEDURE — 700102 HCHG RX REV CODE 250 W/ 637 OVERRIDE(OP): Performed by: INTERNAL MEDICINE

## 2023-01-01 PROCEDURE — 80053 COMPREHEN METABOLIC PANEL: CPT

## 2023-01-01 PROCEDURE — 3075F SYST BP GE 130 - 139MM HG: CPT | Performed by: STUDENT IN AN ORGANIZED HEALTH CARE EDUCATION/TRAINING PROGRAM

## 2023-01-01 PROCEDURE — 84295 ASSAY OF SERUM SODIUM: CPT

## 2023-01-01 PROCEDURE — 93010 ELECTROCARDIOGRAM REPORT: CPT | Performed by: INTERNAL MEDICINE

## 2023-01-01 PROCEDURE — 82962 GLUCOSE BLOOD TEST: CPT | Mod: 91

## 2023-01-01 PROCEDURE — 5A0221D ASSISTANCE WITH CARDIAC OUTPUT USING IMPELLER PUMP, CONTINUOUS: ICD-10-PCS | Performed by: INTERNAL MEDICINE

## 2023-01-01 PROCEDURE — 99292 CRITICAL CARE ADDL 30 MIN: CPT | Performed by: INTERNAL MEDICINE

## 2023-01-01 PROCEDURE — 85027 COMPLETE CBC AUTOMATED: CPT

## 2023-01-01 PROCEDURE — 83605 ASSAY OF LACTIC ACID: CPT

## 2023-01-01 PROCEDURE — 99152 MOD SED SAME PHYS/QHP 5/>YRS: CPT | Performed by: INTERNAL MEDICINE

## 2023-01-01 PROCEDURE — 03HY32Z INSERTION OF MONITORING DEVICE INTO UPPER ARTERY, PERCUTANEOUS APPROACH: ICD-10-PCS | Performed by: INTERNAL MEDICINE

## 2023-01-01 PROCEDURE — 83880 ASSAY OF NATRIURETIC PEPTIDE: CPT

## 2023-01-01 PROCEDURE — 86850 RBC ANTIBODY SCREEN: CPT

## 2023-01-01 PROCEDURE — 02F03ZZ FRAGMENTATION IN CORONARY ARTERY, ONE ARTERY, PERCUTANEOUS APPROACH: ICD-10-PCS | Performed by: INTERNAL MEDICINE

## 2023-01-01 PROCEDURE — 82248 BILIRUBIN DIRECT: CPT

## 2023-01-01 PROCEDURE — 82803 BLOOD GASES ANY COMBINATION: CPT | Mod: 91

## 2023-01-01 PROCEDURE — 33990 INSJ PERQ VAD L HRT ARTERIAL: CPT | Performed by: INTERNAL MEDICINE

## 2023-01-01 PROCEDURE — 84132 ASSAY OF SERUM POTASSIUM: CPT

## 2023-01-01 PROCEDURE — 92920 PRQ TRLUML C ANGIOP 1ART&/BR: CPT | Mod: 59,LC | Performed by: INTERNAL MEDICINE

## 2023-01-01 PROCEDURE — 85014 HEMATOCRIT: CPT | Mod: 91

## 2023-01-01 PROCEDURE — 82330 ASSAY OF CALCIUM: CPT

## 2023-01-01 PROCEDURE — 93308 TTE F-UP OR LMTD: CPT | Mod: 26 | Performed by: INTERNAL MEDICINE

## 2023-01-01 PROCEDURE — 36430 TRANSFUSION BLD/BLD COMPNT: CPT

## 2023-01-01 PROCEDURE — 80048 BASIC METABOLIC PNL TOTAL CA: CPT | Mod: 91

## 2023-01-01 PROCEDURE — 700101 HCHG RX REV CODE 250: Performed by: INTERNAL MEDICINE

## 2023-01-01 PROCEDURE — A9270 NON-COVERED ITEM OR SERVICE: HCPCS | Performed by: INTERNAL MEDICINE

## 2023-01-01 PROCEDURE — 82247 BILIRUBIN TOTAL: CPT

## 2023-01-01 PROCEDURE — 700111 HCHG RX REV CODE 636 W/ 250 OVERRIDE (IP)

## 2023-01-01 PROCEDURE — 84145 PROCALCITONIN (PCT): CPT

## 2023-01-01 PROCEDURE — 84484 ASSAY OF TROPONIN QUANT: CPT

## 2023-01-01 PROCEDURE — 37799 UNLISTED PX VASCULAR SURGERY: CPT

## 2023-01-01 PROCEDURE — 700111 HCHG RX REV CODE 636 W/ 250 OVERRIDE (IP): Performed by: INTERNAL MEDICINE

## 2023-01-01 PROCEDURE — 85018 HEMOGLOBIN: CPT | Mod: 91

## 2023-01-01 PROCEDURE — 99153 MOD SED SAME PHYS/QHP EA: CPT

## 2023-01-01 PROCEDURE — 4A023N8 MEASUREMENT OF CARDIAC SAMPLING AND PRESSURE, BILATERAL, PERCUTANEOUS APPROACH: ICD-10-PCS | Performed by: INTERNAL MEDICINE

## 2023-01-01 PROCEDURE — 85610 PROTHROMBIN TIME: CPT

## 2023-01-01 PROCEDURE — 99213 OFFICE O/P EST LOW 20 MIN: CPT | Mod: GE | Performed by: STUDENT IN AN ORGANIZED HEALTH CARE EDUCATION/TRAINING PROGRAM

## 2023-01-01 PROCEDURE — 93010 ELECTROCARDIOGRAM REPORT: CPT | Mod: 59 | Performed by: INTERNAL MEDICINE

## 2023-01-01 PROCEDURE — B2111ZZ FLUOROSCOPY OF MULTIPLE CORONARY ARTERIES USING LOW OSMOLAR CONTRAST: ICD-10-PCS | Performed by: INTERNAL MEDICINE

## 2023-01-01 PROCEDURE — 85007 BL SMEAR W/DIFF WBC COUNT: CPT

## 2023-01-01 PROCEDURE — 99291 CRITICAL CARE FIRST HOUR: CPT

## 2023-01-01 PROCEDURE — 770022 HCHG ROOM/CARE - ICU (200)

## 2023-01-01 PROCEDURE — A9270 NON-COVERED ITEM OR SERVICE: HCPCS

## 2023-01-01 PROCEDURE — B2151ZZ FLUOROSCOPY OF LEFT HEART USING LOW OSMOLAR CONTRAST: ICD-10-PCS | Performed by: INTERNAL MEDICINE

## 2023-01-01 PROCEDURE — 700101 HCHG RX REV CODE 250

## 2023-01-01 PROCEDURE — P9016 RBC LEUKOCYTES REDUCED: HCPCS

## 2023-01-01 PROCEDURE — 93325 DOPPLER ECHO COLOR FLOW MAPG: CPT

## 2023-01-01 PROCEDURE — 92979 ENDOLUMINL IVUS OCT C EA: CPT

## 2023-01-01 PROCEDURE — 83615 LACTATE (LD) (LDH) ENZYME: CPT

## 2023-01-01 PROCEDURE — 02WAXRZ REVISION OF SHORT-TERM EXTERNAL HEART ASSIST SYSTEM IN HEART, EXTERNAL APPROACH: ICD-10-PCS | Performed by: INTERNAL MEDICINE

## 2023-01-01 PROCEDURE — 82962 GLUCOSE BLOOD TEST: CPT

## 2023-01-01 PROCEDURE — 36620 INSERTION CATHETER ARTERY: CPT

## 2023-01-01 PROCEDURE — 92928 PRQ TCAT PLMT NTRAC ST 1 LES: CPT | Mod: LD | Performed by: INTERNAL MEDICINE

## 2023-01-01 PROCEDURE — 86923 COMPATIBILITY TEST ELECTRIC: CPT

## 2023-01-01 PROCEDURE — 700117 HCHG RX CONTRAST REV CODE 255: Performed by: INTERNAL MEDICINE

## 2023-01-01 PROCEDURE — 700105 HCHG RX REV CODE 258: Performed by: EMERGENCY MEDICINE

## 2023-01-01 PROCEDURE — 33993 REPOSG PERQ R/L HRT VAD: CPT | Performed by: INTERNAL MEDICINE

## 2023-01-01 PROCEDURE — C9113 INJ PANTOPRAZOLE SODIUM, VIA: HCPCS | Performed by: INTERNAL MEDICINE

## 2023-01-01 PROCEDURE — 700111 HCHG RX REV CODE 636 W/ 250 OVERRIDE (IP): Mod: JZ | Performed by: INTERNAL MEDICINE

## 2023-01-01 PROCEDURE — 81001 URINALYSIS AUTO W/SCOPE: CPT

## 2023-01-01 RX ORDER — HYDROMORPHONE HYDROCHLORIDE 2 MG/ML
4 INJECTION, SOLUTION INTRAMUSCULAR; INTRAVENOUS; SUBCUTANEOUS
Status: DISCONTINUED | OUTPATIENT
Start: 2023-01-01 | End: 2023-01-01 | Stop reason: HOSPADM

## 2023-01-01 RX ORDER — ATROPINE SULFATE 10 MG/ML
2 SOLUTION/ DROPS OPHTHALMIC EVERY 4 HOURS PRN
Status: DISCONTINUED | OUTPATIENT
Start: 2023-01-01 | End: 2023-01-01 | Stop reason: HOSPADM

## 2023-01-01 RX ORDER — SODIUM CHLORIDE, SODIUM LACTATE, POTASSIUM CHLORIDE, AND CALCIUM CHLORIDE .6; .31; .03; .02 G/100ML; G/100ML; G/100ML; G/100ML
500 INJECTION, SOLUTION INTRAVENOUS ONCE
Status: COMPLETED | OUTPATIENT
Start: 2023-01-01 | End: 2023-01-01

## 2023-01-01 RX ORDER — ONDANSETRON 2 MG/ML
4 INJECTION INTRAMUSCULAR; INTRAVENOUS ONCE
Status: COMPLETED | OUTPATIENT
Start: 2023-01-01 | End: 2023-01-01

## 2023-01-01 RX ORDER — NOREPINEPHRINE BITARTRATE 0.03 MG/ML
INJECTION, SOLUTION INTRAVENOUS
Status: ACTIVE
Start: 2023-01-01 | End: 2023-01-01

## 2023-01-01 RX ORDER — CLOPIDOGREL BISULFATE 75 MG/1
75 TABLET ORAL DAILY
Status: DISCONTINUED | OUTPATIENT
Start: 2023-01-01 | End: 2023-01-01

## 2023-01-01 RX ORDER — HEPARIN SODIUM 1000 [USP'U]/ML
INJECTION, SOLUTION INTRAVENOUS; SUBCUTANEOUS
Status: COMPLETED
Start: 2023-01-01 | End: 2023-01-01

## 2023-01-01 RX ORDER — PANTOPRAZOLE SODIUM 40 MG/10ML
40 INJECTION, POWDER, LYOPHILIZED, FOR SOLUTION INTRAVENOUS 2 TIMES DAILY
Status: DISCONTINUED | OUTPATIENT
Start: 2023-01-01 | End: 2023-01-01

## 2023-01-01 RX ORDER — SODIUM CHLORIDE 9 MG/ML
INJECTION, SOLUTION INTRAVENOUS CONTINUOUS
Status: ACTIVE | OUTPATIENT
Start: 2023-01-01 | End: 2023-01-01

## 2023-01-01 RX ORDER — ATORVASTATIN CALCIUM 40 MG/1
40 TABLET, FILM COATED ORAL EVERY EVENING
Status: DISCONTINUED | OUTPATIENT
Start: 2023-01-01 | End: 2023-01-01

## 2023-01-01 RX ORDER — METOPROLOL TARTRATE 100 MG/1
TABLET ORAL
Qty: 180 TABLET | Refills: 0 | Status: SHIPPED | OUTPATIENT
Start: 2023-01-01 | End: 2023-01-01

## 2023-01-01 RX ORDER — METOPROLOL TARTRATE 100 MG/1
TABLET ORAL
Qty: 180 TABLET | Refills: 3 | Status: SHIPPED | OUTPATIENT
Start: 2023-01-01 | End: 2023-09-24

## 2023-01-01 RX ORDER — ONDANSETRON 2 MG/ML
4 INJECTION INTRAMUSCULAR; INTRAVENOUS EVERY 4 HOURS PRN
Status: DISCONTINUED | OUTPATIENT
Start: 2023-01-01 | End: 2023-01-01

## 2023-01-01 RX ORDER — ASPIRIN 81 MG/1
81 TABLET ORAL DAILY
Status: DISCONTINUED | OUTPATIENT
Start: 2023-01-01 | End: 2023-01-01

## 2023-01-01 RX ORDER — CLOPIDOGREL BISULFATE 75 MG/1
300 TABLET ORAL ONCE
Status: DISCONTINUED | OUTPATIENT
Start: 2023-01-01 | End: 2023-01-01

## 2023-01-01 RX ORDER — PHENYLEPHRINE HCL IN 0.9% NACL 0.5 MG/5ML
SYRINGE (ML) INTRAVENOUS
Status: COMPLETED
Start: 2023-01-01 | End: 2023-01-01

## 2023-01-01 RX ORDER — DEXTROSE MONOHYDRATE 25 G/50ML
25 INJECTION, SOLUTION INTRAVENOUS
Status: DISCONTINUED | OUTPATIENT
Start: 2023-01-01 | End: 2023-01-01

## 2023-01-01 RX ORDER — NOREPINEPHRINE BITARTRATE 1 MG/ML
INJECTION, SOLUTION INTRAVENOUS
Status: COMPLETED
Start: 2023-01-01 | End: 2023-01-01

## 2023-01-01 RX ORDER — DEXMEDETOMIDINE HYDROCHLORIDE 4 UG/ML
.1-1.5 INJECTION, SOLUTION INTRAVENOUS CONTINUOUS
Status: DISCONTINUED | OUTPATIENT
Start: 2023-01-01 | End: 2023-01-01 | Stop reason: HOSPADM

## 2023-01-01 RX ORDER — ONDANSETRON 2 MG/ML
8 INJECTION INTRAMUSCULAR; INTRAVENOUS EVERY 8 HOURS PRN
Status: DISCONTINUED | OUTPATIENT
Start: 2023-01-01 | End: 2023-01-01 | Stop reason: HOSPADM

## 2023-01-01 RX ORDER — SODIUM CHLORIDE 9 MG/ML
1000 INJECTION, SOLUTION INTRAVENOUS ONCE
Status: COMPLETED | OUTPATIENT
Start: 2023-01-01 | End: 2023-01-01

## 2023-01-01 RX ORDER — HYDROMORPHONE HYDROCHLORIDE 2 MG/ML
2 INJECTION, SOLUTION INTRAMUSCULAR; INTRAVENOUS; SUBCUTANEOUS
Status: DISCONTINUED | OUTPATIENT
Start: 2023-01-01 | End: 2023-01-01 | Stop reason: HOSPADM

## 2023-01-01 RX ORDER — HEPARIN SODIUM 200 [USP'U]/100ML
INJECTION, SOLUTION INTRAVENOUS
Status: COMPLETED
Start: 2023-01-01 | End: 2023-01-01

## 2023-01-01 RX ORDER — GLYCOPYRROLATE 1 MG/1
1 TABLET ORAL 3 TIMES DAILY PRN
Status: DISCONTINUED | OUTPATIENT
Start: 2023-01-01 | End: 2023-01-01 | Stop reason: HOSPADM

## 2023-01-01 RX ORDER — SODIUM CHLORIDE 9 MG/ML
INJECTION, SOLUTION INTRAVENOUS CONTINUOUS
Status: CANCELLED | OUTPATIENT
Start: 2023-01-01

## 2023-01-01 RX ORDER — ONDANSETRON 4 MG/1
8 TABLET, ORALLY DISINTEGRATING ORAL EVERY 8 HOURS PRN
Status: DISCONTINUED | OUTPATIENT
Start: 2023-01-01 | End: 2023-01-01 | Stop reason: HOSPADM

## 2023-01-01 RX ORDER — NOREPINEPHRINE BITARTRATE 0.03 MG/ML
0-1 INJECTION, SOLUTION INTRAVENOUS CONTINUOUS
Status: DISCONTINUED | OUTPATIENT
Start: 2023-01-01 | End: 2023-01-01

## 2023-01-01 RX ORDER — LORAZEPAM 0.5 MG/1
0.5 TABLET ORAL EVERY 6 HOURS PRN
Status: DISCONTINUED | OUTPATIENT
Start: 2023-01-01 | End: 2023-01-01 | Stop reason: HOSPADM

## 2023-01-01 RX ORDER — LORAZEPAM 2 MG/ML
1 CONCENTRATE ORAL
Status: DISCONTINUED | OUTPATIENT
Start: 2023-01-01 | End: 2023-01-01 | Stop reason: HOSPADM

## 2023-01-01 RX ORDER — LORAZEPAM 2 MG/ML
1 INJECTION INTRAMUSCULAR
Status: DISCONTINUED | OUTPATIENT
Start: 2023-01-01 | End: 2023-01-01 | Stop reason: HOSPADM

## 2023-01-01 RX ORDER — MAGNESIUM SULFATE HEPTAHYDRATE 40 MG/ML
4 INJECTION, SOLUTION INTRAVENOUS ONCE
Status: COMPLETED | OUTPATIENT
Start: 2023-01-01 | End: 2023-01-01

## 2023-01-01 RX ORDER — LIDOCAINE HYDROCHLORIDE 20 MG/ML
INJECTION, SOLUTION INFILTRATION; PERINEURAL
Status: COMPLETED
Start: 2023-01-01 | End: 2023-01-01

## 2023-01-01 RX ORDER — DIGOXIN 0.25 MG/ML
250 INJECTION INTRAMUSCULAR; INTRAVENOUS EVERY 6 HOURS
Status: COMPLETED | OUTPATIENT
Start: 2023-01-01 | End: 2023-01-01

## 2023-01-01 RX ORDER — SIMVASTATIN 10 MG
10 TABLET ORAL DAILY
Qty: 90 TABLET | Refills: 0 | Status: SHIPPED | OUTPATIENT
Start: 2023-01-01 | End: 2023-09-24

## 2023-01-01 RX ORDER — VERAPAMIL HYDROCHLORIDE 2.5 MG/ML
INJECTION, SOLUTION INTRAVENOUS
Status: COMPLETED
Start: 2023-01-01 | End: 2023-01-01

## 2023-01-01 RX ORDER — ACETAMINOPHEN 650 MG/1
650 SUPPOSITORY RECTAL EVERY 4 HOURS PRN
Status: DISCONTINUED | OUTPATIENT
Start: 2023-01-01 | End: 2023-01-01 | Stop reason: HOSPADM

## 2023-01-01 RX ORDER — TRIAMTERENE AND HYDROCHLOROTHIAZIDE 37.5; 25 MG/1; MG/1
1 TABLET ORAL DAILY
Qty: 90 TABLET | Refills: 3 | Status: SHIPPED | OUTPATIENT
Start: 2023-01-01 | End: 2023-09-24

## 2023-01-01 RX ORDER — ACETAMINOPHEN 325 MG/1
650 TABLET ORAL EVERY 4 HOURS PRN
Status: DISCONTINUED | OUTPATIENT
Start: 2023-01-01 | End: 2023-01-01 | Stop reason: HOSPADM

## 2023-01-01 RX ORDER — NITROGLYCERIN 20 MG/100ML
0-200 INJECTION INTRAVENOUS CONTINUOUS
Status: DISCONTINUED | OUTPATIENT
Start: 2023-01-01 | End: 2023-01-01

## 2023-01-01 RX ORDER — OXYCODONE HYDROCHLORIDE 5 MG/1
2.5 TABLET ORAL EVERY 4 HOURS PRN
Status: DISCONTINUED | OUTPATIENT
Start: 2023-01-01 | End: 2023-01-01 | Stop reason: HOSPADM

## 2023-01-01 RX ORDER — SCOLOPAMINE TRANSDERMAL SYSTEM 1 MG/1
1 PATCH, EXTENDED RELEASE TRANSDERMAL
Status: DISCONTINUED | OUTPATIENT
Start: 2023-01-01 | End: 2023-01-01 | Stop reason: HOSPADM

## 2023-01-01 RX ORDER — FUROSEMIDE 10 MG/ML
40 INJECTION INTRAMUSCULAR; INTRAVENOUS
Status: DISCONTINUED | OUTPATIENT
Start: 2023-01-01 | End: 2023-01-01 | Stop reason: HOSPADM

## 2023-01-01 RX ORDER — GLYCOPYRROLATE 0.2 MG/ML
0.2 INJECTION INTRAMUSCULAR; INTRAVENOUS 3 TIMES DAILY PRN
Status: DISCONTINUED | OUTPATIENT
Start: 2023-01-01 | End: 2023-01-01 | Stop reason: HOSPADM

## 2023-01-01 RX ORDER — HEPARIN SODIUM 5000 [USP'U]/100ML
0-1000 INJECTION, SOLUTION INTRAVENOUS CONTINUOUS
Status: DISCONTINUED | OUTPATIENT
Start: 2023-01-01 | End: 2023-01-01

## 2023-01-01 RX ORDER — CLOPIDOGREL 300 MG/1
TABLET, FILM COATED ORAL
Status: COMPLETED
Start: 2023-01-01 | End: 2023-01-01

## 2023-01-01 RX ORDER — MIDAZOLAM HYDROCHLORIDE 1 MG/ML
INJECTION INTRAMUSCULAR; INTRAVENOUS
Status: COMPLETED
Start: 2023-01-01 | End: 2023-01-01

## 2023-01-01 RX ORDER — ACETAMINOPHEN 325 MG/1
650 TABLET ORAL EVERY 6 HOURS PRN
Status: DISCONTINUED | OUTPATIENT
Start: 2023-01-01 | End: 2023-01-01

## 2023-01-01 RX ORDER — ACETAMINOPHEN 500 MG
1000 TABLET ORAL EVERY 6 HOURS PRN
Status: DISCONTINUED | OUTPATIENT
Start: 2023-01-01 | End: 2023-01-01

## 2023-01-01 RX ADMIN — DOBUTAMINE HYDROCHLORIDE 15 MCG/KG/MIN: 100 INJECTION INTRAVENOUS at 00:30

## 2023-01-01 RX ADMIN — HEPARIN SODIUM 1168 UNITS/HR: 5000 INJECTION, SOLUTION INTRAVENOUS at 05:37

## 2023-01-01 RX ADMIN — HEPARIN SODIUM 110 UNITS/HR: 1000 INJECTION, SOLUTION INTRAVENOUS; SUBCUTANEOUS at 03:42

## 2023-01-01 RX ADMIN — MIDAZOLAM 2 MG: 1 INJECTION, SOLUTION INTRAMUSCULAR; INTRAVENOUS at 22:24

## 2023-01-01 RX ADMIN — NOREPINEPHRINE BITARTRATE 0.05 MCG/KG/MIN: 1 INJECTION, SOLUTION, CONCENTRATE INTRAVENOUS at 06:08

## 2023-01-01 RX ADMIN — PANTOPRAZOLE SODIUM 40 MG: 40 INJECTION, POWDER, FOR SOLUTION INTRAVENOUS at 08:07

## 2023-01-01 RX ADMIN — NOREPINEPHRINE BITARTRATE 0.05 MCG/KG/MIN: 1 INJECTION, SOLUTION, CONCENTRATE INTRAVENOUS at 10:27

## 2023-01-01 RX ADMIN — FUROSEMIDE 40 MG: 10 INJECTION INTRAMUSCULAR; INTRAVENOUS at 00:59

## 2023-01-01 RX ADMIN — INSULIN HUMAN 2 UNITS: 100 INJECTION, SOLUTION PARENTERAL at 17:34

## 2023-01-01 RX ADMIN — DIGOXIN 250 MCG: 0.25 INJECTION INTRAMUSCULAR; INTRAVENOUS at 09:15

## 2023-01-01 RX ADMIN — LORAZEPAM 0.5 MG: 0.5 TABLET ORAL at 10:13

## 2023-01-01 RX ADMIN — HEPARIN SODIUM: 1000 INJECTION, SOLUTION INTRAVENOUS; SUBCUTANEOUS at 21:35

## 2023-01-01 RX ADMIN — NOREPINEPHRINE BITARTRATE 8 MG: 1 INJECTION, SOLUTION, CONCENTRATE INTRAVENOUS at 21:26

## 2023-01-01 RX ADMIN — LORAZEPAM 1 MG: 2 INJECTION INTRAMUSCULAR; INTRAVENOUS at 14:19

## 2023-01-01 RX ADMIN — DOBUTAMINE HYDROCHLORIDE 20 MCG/KG/MIN: 100 INJECTION INTRAVENOUS at 17:37

## 2023-01-01 RX ADMIN — HYDROMORPHONE HYDROCHLORIDE 4 MG: 2 INJECTION, SOLUTION INTRAMUSCULAR; INTRAVENOUS; SUBCUTANEOUS at 14:18

## 2023-01-01 RX ADMIN — NITROGLYCERIN 10 ML: 20 INJECTION INTRAVENOUS at 21:12

## 2023-01-01 RX ADMIN — ASPIRIN 81 MG: 81 TABLET, COATED ORAL at 05:46

## 2023-01-01 RX ADMIN — CLOPIDOGREL BISULFATE 600 MG: 300 TABLET, FILM COATED ORAL at 23:12

## 2023-01-01 RX ADMIN — VERAPAMIL HYDROCHLORIDE 5 MG: 2.5 INJECTION, SOLUTION INTRAVENOUS at 21:02

## 2023-01-01 RX ADMIN — ONDANSETRON 4 MG: 2 INJECTION INTRAMUSCULAR; INTRAVENOUS at 09:46

## 2023-01-01 RX ADMIN — CLOPIDOGREL BISULFATE 75 MG: 75 TABLET ORAL at 05:47

## 2023-01-01 RX ADMIN — ACETAMINOPHEN 1000 MG: 500 TABLET, FILM COATED ORAL at 20:15

## 2023-01-01 RX ADMIN — DOBUTAMINE HYDROCHLORIDE 15 MCG/KG/MIN: 100 INJECTION INTRAVENOUS at 04:13

## 2023-01-01 RX ADMIN — VASOPRESSIN 0.03 UNITS/MIN: 20 INJECTION INTRAVENOUS at 23:51

## 2023-01-01 RX ADMIN — ONDANSETRON 4 MG: 2 INJECTION INTRAMUSCULAR; INTRAVENOUS at 02:31

## 2023-01-01 RX ADMIN — NITROGLYCERIN 40 MCG/MIN: 20 INJECTION INTRAVENOUS at 00:55

## 2023-01-01 RX ADMIN — HEPARIN SODIUM: 1000 INJECTION, SOLUTION INTRAVENOUS; SUBCUTANEOUS at 21:01

## 2023-01-01 RX ADMIN — SODIUM CHLORIDE 500 ML: 9 INJECTION, SOLUTION INTRAVENOUS at 00:39

## 2023-01-01 RX ADMIN — ATORVASTATIN CALCIUM 40 MG: 40 TABLET, FILM COATED ORAL at 17:13

## 2023-01-01 RX ADMIN — INSULIN HUMAN 2 UNITS: 100 INJECTION, SOLUTION PARENTERAL at 00:50

## 2023-01-01 RX ADMIN — FUROSEMIDE 40 MG: 10 INJECTION INTRAMUSCULAR; INTRAVENOUS at 05:46

## 2023-01-01 RX ADMIN — DOBUTAMINE HYDROCHLORIDE 20 MCG/KG/MIN: 100 INJECTION INTRAVENOUS at 13:10

## 2023-01-01 RX ADMIN — OXYCODONE HYDROCHLORIDE 2.5 MG: 5 TABLET ORAL at 17:13

## 2023-01-01 RX ADMIN — DOBUTAMINE HYDROCHLORIDE 10 MCG/KG/MIN: 100 INJECTION INTRAVENOUS at 00:42

## 2023-01-01 RX ADMIN — NOREPINEPHRINE BITARTRATE 0.04 MCG/KG/MIN: 1 INJECTION, SOLUTION, CONCENTRATE INTRAVENOUS at 03:22

## 2023-01-01 RX ADMIN — ACETAMINOPHEN 1000 MG: 500 TABLET, FILM COATED ORAL at 13:24

## 2023-01-01 RX ADMIN — NOREPINEPHRINE BITARTRATE 0.21 MCG/KG/MIN: 1 INJECTION, SOLUTION, CONCENTRATE INTRAVENOUS at 01:17

## 2023-01-01 RX ADMIN — INSULIN HUMAN 1 UNITS: 100 INJECTION, SOLUTION PARENTERAL at 12:43

## 2023-01-01 RX ADMIN — NITROGLYCERIN 40 MCG/MIN: 20 INJECTION INTRAVENOUS at 05:37

## 2023-01-01 RX ADMIN — FENTANYL CITRATE 50 MCG: 50 INJECTION, SOLUTION INTRAMUSCULAR; INTRAVENOUS at 23:09

## 2023-01-01 RX ADMIN — HEPARIN SODIUM 790 UNITS/HR: 5000 INJECTION, SOLUTION INTRAVENOUS at 03:47

## 2023-01-01 RX ADMIN — SODIUM CHLORIDE, POTASSIUM CHLORIDE, SODIUM LACTATE AND CALCIUM CHLORIDE 500 ML: 600; 310; 30; 20 INJECTION, SOLUTION INTRAVENOUS at 22:10

## 2023-01-01 RX ADMIN — HEPARIN SODIUM 2000 UNITS: 200 INJECTION, SOLUTION INTRAVENOUS at 21:02

## 2023-01-01 RX ADMIN — IOHEXOL 150 ML: 350 INJECTION, SOLUTION INTRAVENOUS at 23:29

## 2023-01-01 RX ADMIN — MAGNESIUM SULFATE HEPTAHYDRATE 4 G: 4 INJECTION, SOLUTION INTRAVENOUS at 09:53

## 2023-01-01 RX ADMIN — DIGOXIN 250 MCG: 0.25 INJECTION INTRAMUSCULAR; INTRAVENOUS at 16:09

## 2023-01-01 RX ADMIN — ASPIRIN 81 MG: 81 TABLET, COATED ORAL at 05:43

## 2023-01-01 RX ADMIN — SODIUM CHLORIDE, POTASSIUM CHLORIDE, SODIUM LACTATE AND CALCIUM CHLORIDE 500 ML: 600; 310; 30; 20 INJECTION, SOLUTION INTRAVENOUS at 02:00

## 2023-01-01 RX ADMIN — INSULIN GLARGINE-YFGN 15 UNITS: 100 INJECTION, SOLUTION SUBCUTANEOUS at 08:03

## 2023-01-01 RX ADMIN — DIGOXIN 250 MCG: 0.25 INJECTION INTRAMUSCULAR; INTRAVENOUS at 03:24

## 2023-01-01 RX ADMIN — DOBUTAMINE HYDROCHLORIDE 15 MCG/KG/MIN: 100 INJECTION INTRAVENOUS at 22:37

## 2023-01-01 RX ADMIN — DEXMEDETOMIDINE 0.2 MCG/KG/HR: 100 INJECTION, SOLUTION INTRAVENOUS at 14:38

## 2023-01-01 RX ADMIN — SODIUM BICARBONATE 100 MEQ: 84 INJECTION, SOLUTION INTRAVENOUS at 22:18

## 2023-01-01 RX ADMIN — INSULIN HUMAN 3 UNITS: 100 INJECTION, SOLUTION PARENTERAL at 06:31

## 2023-01-01 RX ADMIN — DOBUTAMINE HYDROCHLORIDE 20 MCG/KG/MIN: 100 INJECTION INTRAVENOUS at 08:56

## 2023-01-01 RX ADMIN — DOBUTAMINE HYDROCHLORIDE 15 MCG/KG/MIN: 100 INJECTION INTRAVENOUS at 10:17

## 2023-01-01 RX ADMIN — LIDOCAINE HYDROCHLORIDE: 20 INJECTION, SOLUTION INFILTRATION; PERINEURAL at 21:01

## 2023-01-01 RX ADMIN — CLOPIDOGREL BISULFATE 75 MG: 75 TABLET ORAL at 05:43

## 2023-01-01 RX ADMIN — ACETAMINOPHEN 650 MG: 325 TABLET, FILM COATED ORAL at 08:30

## 2023-01-01 RX ADMIN — SODIUM CHLORIDE 1000 ML: 9 INJECTION, SOLUTION INTRAVENOUS at 21:00

## 2023-01-01 ASSESSMENT — COGNITIVE AND FUNCTIONAL STATUS - GENERAL
STANDING UP FROM CHAIR USING ARMS: TOTAL
SUGGESTED CMS G CODE MODIFIER MOBILITY: CM
MOVING FROM LYING ON BACK TO SITTING ON SIDE OF FLAT BED: UNABLE
TOILETING: TOTAL
PERSONAL GROOMING: TOTAL
EATING MEALS: TOTAL
CLIMB 3 TO 5 STEPS WITH RAILING: TOTAL
DAILY ACTIVITIY SCORE: 6
DRESSING REGULAR UPPER BODY CLOTHING: TOTAL
TURNING FROM BACK TO SIDE WHILE IN FLAT BAD: A LOT
MOBILITY SCORE: 7
WALKING IN HOSPITAL ROOM: TOTAL
DRESSING REGULAR LOWER BODY CLOTHING: TOTAL
SUGGESTED CMS G CODE MODIFIER DAILY ACTIVITY: CN
HELP NEEDED FOR BATHING: TOTAL
MOVING TO AND FROM BED TO CHAIR: UNABLE

## 2023-01-01 ASSESSMENT — FIBROSIS 4 INDEX
FIB4 SCORE: 15.23
FIB4 SCORE: 2.1

## 2023-01-01 ASSESSMENT — PAIN DESCRIPTION - PAIN TYPE
TYPE: ACUTE PAIN

## 2023-01-01 ASSESSMENT — ENCOUNTER SYMPTOMS
PALPITATIONS: 0
VOMITING: 0
BACK PAIN: 1
NAUSEA: 0
COUGH: 0
BLURRED VISION: 0
HEADACHES: 0
DIZZINESS: 0
NERVOUS/ANXIOUS: 0
ABDOMINAL PAIN: 0
SHORTNESS OF BREATH: 0
HEMOPTYSIS: 0
NECK PAIN: 0
FEVER: 0
DOUBLE VISION: 0
SORE THROAT: 0
CHILLS: 0

## 2023-06-05 NOTE — TELEPHONE ENCOUNTER
Received refill request for Alprazolam  Last seen on 4/9/19   Received request via: Pharmacy    Was the patient seen in the last year in this department? Yes    Does the patient have an active prescription (recently filled or refills available) for medication(s) requested? No    Does the patient have [facility] and need 100 day supply (blood pressure, diabetes and cholesterol meds only)? Patient does not have SCP

## 2023-06-15 PROBLEM — L98.9 SKIN LESION: Status: ACTIVE | Noted: 2023-01-01

## 2023-06-16 NOTE — ASSESSMENT & PLAN NOTE
2 hyperpigmented skin lesions over right neck. No alarming features, suspect lesions are hyperpigmented skin tags. Discussed management options including referral to dermatology. Patient would like to continue to monitor.

## 2023-06-16 NOTE — PROGRESS NOTES
Subjective:     CC: Follow up     HPI:   Madison presents today with    Problem   Skin Lesion    2 hyperpigmented skin lesions over right neck. Patient does not know how long skin lesions have been present or know if they have changed in appearance.      Htn (Hypertension)    Home blood pressure average: Does not monitor blood pressure      Initial blood pressure reading today 146/88 and repeat 138/75.     Medications:   ACE-I-    ARB-    CCB-   HCTZ- Triamterene-HCTZ 37.5-25mg daily   Alpha blocker-   Beta blocker- Metoprolol 100mg daily   Diuretics-   Tolerating medications without adverse effects:    Patient reports having low heart rate of 40-50s, but essentially asymptomatic. Discussed option to decrease dose of medication.      Gastroesophageal Reflux Disease    Symptoms controlled with over the counter omeprazole.         ROS: See HPI.     Objective:     Exam:  /75   Pulse (!) 54   SpO2 96%  There is no height or weight on file to calculate BMI.    Physical Exam:  General: Pt resting in NAD, cooperative   Skin:  Multiple pedunculated flesh colored lesions over right neck (skin tags), 2 hyperpigmented raised ~0.5 cm oval shaped lesions   HEENT: NC/AT. EOMI. No conjunctival injection or sclera icterus.   Lungs:  CTAB, good air movement. Non-labored.   Cardiovascular:  S1/S2 RRR   Extremities:  No lower extremity edema   CNS:  No gross focal neurologic deficits  Psych: Appropriate mood and affect       Assessment & Plan:     77 y.o. female with the following -     Problem List Items Addressed This Visit       HTN (hypertension)     Chronic. Controlled. Continue current regimen.   -Recommend having further discussions at future appointment about lowering dose of metoprolol or switching medications.   -Recommended checking labs, but patient does not want to get labs.          Gastroesophageal reflux disease     Chronic. Controlled.   -Discussed that there are potential adverse effects with chronic PPI use.  Discussed option of discontinuing omeprazole and recommend having further discussions at future appointment.          Skin lesion     2 hyperpigmented skin lesions over right neck. No alarming features, suspect lesions are hyperpigmented skin tags. Discussed management options including referral to dermatology. Patient would like to continue to monitor.             This appointment was completed at patients home.

## 2023-06-16 NOTE — ASSESSMENT & PLAN NOTE
Chronic. Controlled. Continue current regimen.   -Recommend having further discussions at future appointment about lowering dose of metoprolol or switching medications.   -Recommended checking labs, but patient does not want to get labs.

## 2023-09-21 PROBLEM — I21.3 STEMI (ST ELEVATION MYOCARDIAL INFARCTION) (HCC): Status: ACTIVE | Noted: 2023-01-01

## 2023-09-22 PROBLEM — I25.10 CORONARY ARTERY DISEASE INVOLVING NATIVE CORONARY ARTERY OF NATIVE HEART: Status: ACTIVE | Noted: 2023-01-01

## 2023-09-22 PROBLEM — R57.0 CARDIOGENIC SHOCK (HCC): Status: ACTIVE | Noted: 2023-01-01

## 2023-09-22 PROBLEM — E87.20 LACTIC ACIDOSIS: Status: ACTIVE | Noted: 2023-01-01

## 2023-09-22 PROBLEM — N17.9 ACUTE RENAL FAILURE SUPERIMPOSED ON STAGE 3 CHRONIC KIDNEY DISEASE (HCC): Status: ACTIVE | Noted: 2018-11-02

## 2023-09-22 PROBLEM — D64.9 ACUTE ANEMIA: Status: ACTIVE | Noted: 2023-01-01

## 2023-09-22 NOTE — PROGRESS NOTES
Leawood catheter not wedging, Dr. Campo notifed and came to bedside. Le[place]wood repositioned to 71cm and now wedging appropriately.

## 2023-09-22 NOTE — ASSESSMENT & PLAN NOTE
Limited data points, difficult to ascertain baseline likely in setting of cardiogenic shock  Follow renal function, avoid nephrotoxins  Maintain perfusion, Impella in place  MAP>65, SBP>90

## 2023-09-22 NOTE — CATH LAB
Cath lab RN and Cath lab tech transported pt to [address] on [address]. Cath lab RN, Cath lab tech, and bedside RN assessed right femoral groin site. Groin site soft and nontender without evidence of hematoma. Dressing is clean, dry, and intact. Cath lab RN reviewed groin management protocol with bedside RN. Pt to remain on bedrest. No further questions per bedside RN.

## 2023-09-22 NOTE — DISCHARGE PLANNING
STEMI Response    Referral: Code STEMI Response    Intervention: SW responded to STEMI.  Pt was BIB [facility] for chest pain.  Pt was alert upon arrival.  Pts name is Madison Beth (: 1945). SW was informed by EMS that the Pt's Son has been notified and is on his way to [facility] now.      Once Pt's Son arrived this SW escorted him back to the bay and he was present for an update by Cardiologist prior to Pt going to Cath lab. Pt taken to [facility] room outside of Cath lab waiting room.    SON: Ryan [phone]    Plan: SW will continue to monitor and assist if needs arise.

## 2023-09-22 NOTE — ASSESSMENT & PLAN NOTE
Inferior STEMI with delayed presentation with diffuse complex multivessel CAD  Successful PCI of left main and LAD with SHAWN x1 and angioplasty to circumflex  Residual severe stenosis in the distal LAD, ostial RCA, ramus and circumflex    Continue antiplatelet therapy  Continue with rosuvastatin  No BB or ACE-I due to hypotension

## 2023-09-22 NOTE — PROGRESS NOTES
Patient arrived via EMS. Stemi alert received. Patient received 324mg of ASA prior to arrival. Dr. Campo at bedside. Cath lab orders placed.

## 2023-09-22 NOTE — PROGRESS NOTES
Dr. You to bedside for arterial line placement. Time out at 0035, all in agreement. RN x3 and resident present. Placement to the left axillary artery completed at 0110.

## 2023-09-22 NOTE — ASSESSMENT & PLAN NOTE
No sign of bleeding     Continue to follow Hg, down to 8.2 today   Type and screen today   Hg goal >8.0   Continue with heparin and DAPT

## 2023-09-22 NOTE — PROGRESS NOTES
Time of arrival to unit at 2355. Patient escorted by ACLS RN x2, report from cath lab nurse received. TR band in place with 14 cc. Impella placed to the right fem at 83cm, and venous sheath at 73cm. Patient alert and oriented on room air.

## 2023-09-22 NOTE — PROGRESS NOTES
Patient complaining of nausea and shortness of breath while experiencing dry heaving at 0140. Suction alarm present on impella, P-level decreased to P8. Suction alarm initially resolved. Patient became hypotensive with SBP 50s. Nitroglycerin stopped. Suction alarm returned, reading positioning in aorta. P-level dropped to P2, notified Jana with [facility]. Stat echo paged. Dr. You to bedside, Dr. Campo to bedside at 0215. Dobutamine to 5 mcg/kg/min, 500 cc bolus LR given. Hematoma noted in groin, pressure applied. Hgb drawn.    Dr. Campo at bedside to reposition Impella with assistance from echo.

## 2023-09-22 NOTE — ASSESSMENT & PLAN NOTE
LVEF 20% by ventriculogram  PCWP 35, Lore CI 0.96 - initially   Impella placed in Cath Lab, P9, 3.2L flow    Continue to follow hemodynamics  Dobutamine infusion and levophed for hemodynamic support   Vasopressin DCed  Will continue to monitor for improvement, she is not a candidate for a repeat PCI or CABG

## 2023-09-22 NOTE — PROGRESS NOTES
Dr. Suarez updated on AM rodriguez numbers. Pt tolerating impella at P9; low pulsitility.   CO 2.4  CI 1.3  SVR 2115  CVP 10   0.5  Marcos 2.5

## 2023-09-22 NOTE — PROCEDURES
Arterial Catheter Procedure Note    Date: 9/22/2023    Procedure:  Arterial Catheter Insertion    Indication: Cardiogenic shock    Physician:  Dr. Sudeep You MD    Consent:  Emergent    Procedure:  The patient is admitted with STEMI and cardiogenic shock with Impella device in place.  Arterial catheter is indicated for continuous invasive BP monitoring to titrate vasoactive agents.  Using ultrasound guidance I attempted to place a left radial arterial catheter without success.  Subsequently the left axilla was prepped and draped using sterile barrier precautions.  Using ultrasound guidance, a 20-gauge, 12 cm arterial catheter was placed in the left axillary artery on the first attempt without difficulty.  There was very little pulsatility which was consistent with waveform noted on Impella device.  The catheter ws sutured in place and a sterile dressing was applied.  No immediate complications.  EBL < 5 cc.

## 2023-09-22 NOTE — CARE PLAN
The patient is Watcher - Medium risk of patient condition declining or worsening    Shift Goals  Clinical Goals: Impella placement, hemoydyanmic stability  Patient Goals: Feel better, control nausea  Family Goals: Rest, get better    Progress made toward(s) clinical / shift goals:    Problem: Pain - Standard  Goal: Alleviation of pain or a reduction in pain to the patient’s comfort goal  Outcome: Progressing     Problem: Knowledge Deficit - Standard  Goal: Patient and family/care givers will demonstrate understanding of plan of care, disease process/condition, diagnostic tests and medications  Outcome: Progressing     Problem: Hemodynamics  Goal: Patient's hemodynamics, fluid balance and neurologic status will be stable or improve  Outcome: Progressing     Problem: Urinary - Renal Perfusion  Goal: Ability to achieve and maintain adequate renal perfusion and functioning will improve  Outcome: Progressing     Problem: Urinary Elimination  Goal: Establish and maintain regular urinary output  Outcome: Progressing

## 2023-09-22 NOTE — PROCEDURES
"CARDIAC CATHETERIZATION REPORT    Referring Provider: Dann Campo MD    PROCEDURE PHYSICIAN: Terrance Adame MD, [facility], [facility]  ASSISTANT: None    IMPRESSIONS:  1. Cardiogenic shock  2.  Severe ischemic cardiomyopathy with diffuse, complex multivessel coronary artery disease  3.  Successful PCI of the left main and the LAD with 1 drug-eluting stent, IVUS guidance  4.  Improvement in stenosis severity in the circumflex using angioplasty, unable to deliver stent  5.  Residual severe stenosis in the distal LAD, ostial right coronary artery, ramus and circumflex vessel  6.  Successful support of cardiogenic shock using Impella CP device    Recommendations:  Further recommendations per the rounding team    Pre-procedure diagnosis:  STEMI with global ischemia pattern, cardiogenic shock  Post-procedure diagnosis:   Same    Procedure performed  Selective coronary angiography  Left heart catheterization  Right heart catheterization  Percutaneous coronary intervention - Stent Placement (left main into LAD)  PCI- Angioplasty (circumflex)  Intravascular Ultrasound (left main, LAD)  Intravascular lithotripsy -LAD, left main  Insertion of Impella CP device    Conscious sedation was supervised by myself and administered by trained personnel using fentanyl and versed between 908 and 1129. The patient tolerated sedation without complication.     Procedure Description  1. Access: Initially a 5/6 Guamanian sheath was placed in the right ulnar artery under micropuncture technique with dynamic ultrasound.  Femoral access was obtained with a 14 Guamanian sheath in the right femoral artery and a 8 Guamanian sheath in the right femoral vein, modified Seldinger technique was used with fluoroscopic and ultrasound guidance    2. Diagnostic description: The catheter was passed to the central circulation with the aide of J tipped 0.35\" wire. A 5F TIG 4.0, 6F JR4, and 5 Guamanian pigtail  were used to inject the coronary circulation  and inject the left " ventricle during invasive hemodynamic monitoring.     3. Description of Intervention: After confirming therapeutic anticoagulation intervention proceeded. A  7 Comoran EBU 3.0  guiding catheter was used-this was placed inside a 7 Comoran destination sheath placed within the Impella sheath and a single access technique.  There was considerable difficulty wiring the lesion in the circumflex due to the acute bend in the circumflex as well as the severe stenosis.  Ultimately I was able to cross the lesion with a Turnpike microcatheter and a  50 wire and then exchanged for a run-through wire to select a distal branch and then exchanged for a wiggle wire.  I then passed the run-through wire into the LAD but it would not traverse the severe stenosis in the distal portion of the vessel.  I performed angioplasty of the circumflex with a 3.0 followed by 3.5 NC balloon and then angioplasty of the LAD with a 3.0, 3.5 and 4.0 NC balloons.  There was rupture of both 3.5 and 4.0 balloons.  I then performed intravascular lithotripsy of the left main and LAD with a 4.0 balloon and 2 separate 10 pulse cycles which resulted in full expansion.  I then attempted to deliver a 3.5 x 16 mm SHAWN into the circumflex however I was unable to deliver the stent and had to abandon efforts so that I could move forward and repair the left anterior descending artery and left main coronary artery.  I then delivered a 3.5 x 32 mm Synergy XD SHAWN from the ostium of the left main through the mid LAD.  Distally the stent was postdilated with a 3.5 NC balloon in the proximal LAD a 4.0 NC balloon and in the left main a 4.5 NC balloon.  Subsequent angiograms showed excellent result in the left main and LAD and residual moderate to severe stenosis within the circumflex system but improved from initial.    4. Closing: At completion of the procedure the relevant equipment was removed from the body and hemostasis achieved with a radial band over the ulnar  artery access and the femoral sheaths were secured in place    Findings   Hemodynamics:   Aorta: 77/54 mmHg  LVEDP: 24 mmHg  No significant pullback gradient across the aortic valve  RA: 32 mmHg  PA: 57/42/46 mmHg  PCWP: 35 mmHg  Cardiac Output/Index (Lore): 1.79 L/min, 0.96 L/min/m2    Coronary Anatomy   Left Main:  Heavily calcified, distal 70% stenosis   LAD:  90% proximal stenosis extending to the midportion of the vessel .  Distally there is 95% stenosis   Ramus: Occluded proximally   LCx:  Proximal 70% and mid 99% stenosis   RCA:  Dominant vessel with heavy calcification at the aorto ostium and 99% stenosis.    Left Ventriculography:   LVEF= 20%.  Global hypokinesis.  2+ mitral regurgitation    Results of intervention to the LAD:  Pre: 90% stenosis and GEENA III flow  Post: 0% residual stenosis and GEENA III flow. No dissection or distal embolization.    Results of intervention to the circumflex:  Pre: 99% stenosis and GEENA III flow  Post: 50% residual stenosis and GEENA III flow. No dissection or distal embolization.    Results of intervention to the left main:  Pre: 70% stenosis and GEENA III flow  Post: 0% residual stenosis and GEENA III flow. No dissection or distal embolization.    Technical Factors  1. Complications: None  2. Estimated Blood Loss: <50 cc  3. Specimens: None  4. Contrast Volume: 150 ml  5. Medications: Radial cocktail (Verapamil 2.5 mg, Nitroglycerin 100 mcg) Heparin to maintain ACT >250 Clopidogrel 600 mg Intracoronary nitroglycerin

## 2023-09-22 NOTE — PROGRESS NOTES
4 Eyes Skin Assessment Completed by Vanita RN and Anamika RN.    Head WDL  Ears WDL  Nose WDL  Mouth WDL  Neck WDL  Breast/Chest Scar  Shoulder Blades WDL  Spine WDL  (R) Arm/Elbow/Hand Bruising  (L) Arm/Elbow/Hand Bruising  Abdomen WDL  Groin WDL  Scrotum/Coccyx/Buttocks WDL  (R) Leg WDL  (L) Leg WDL  (R) Heel/Foot/Toe Redness and Blanching  (L) Heel/Foot/Toe Redness and Blanching          Devices In Places ECG, Blood Pressure Cuff, Pulse Ox, Mack, Arterial Line, Impella, Leg Immobilizer, and Central Line      Interventions In Place Pillows    Possible Skin Injury No    Pictures Uploaded Into Epic N/A  Wound Consult Placed N/A  RN Wound Prevention Protocol Ordered No

## 2023-09-22 NOTE — CONSULTS
Reason for Consult:  Asked by Dr Orestes Gross M.D. to see this patient with stemi    CC: chest pain    HPI:      77 year old woman with PMH HTN, CKD presents with chest pressure. Describes over past week chest pressure, dyspnea on exertion, diaphoresis improved with rest worsening today. At presentation left chest pressure 3/10 somewhat improved from earlier today. Adds her  recently had heart disease. Her son with congenital disease passed away recently. She has one other living son. Denies toxic social habits.     Medications / Drug list prior to admission:  No current facility-administered medications on file prior to encounter.     Current Outpatient Medications on File Prior to Encounter   Medication Sig Dispense Refill    triamterene-hctz (MAXZIDE-25/DYAZIDE) 37.5-25 MG Tab Take 1 tablet by mouth once daily 90 Tablet 3    metoprolol tartrate (LOPRESSOR) 100 MG Tab Take 1 tablet by mouth twice daily 180 Tablet 3    simvastatin (ZOCOR) 10 MG Tab Take 1 tablet by mouth once daily 90 Tablet 0    hydrocortisone 1 % Cream Apply 1 g to affected area(s) 2 times a day. (Patient not taking: Reported on 6/3/2022) 1 Tube 0    Omeprazole (PRILOSEC PO) Take  by mouth.         Current list of administered Medications:    Current Facility-Administered Medications:     iohexol (OMNIPAQUE) 350 mg/mL, 1-300 mL, Intra-arterial, Once, Terrance Adame M.D.    FENTANYL CITRATE (PF) 0.05 MG/ML INJ SOLN (WRAPPED), , , ,     MIDAZOLAM HCL 2 MG/2ML INJ SOLN (WRAPPER), , , ,     NITROGLYCERIN 2 MG IV SOLN, , , ,     Current Outpatient Medications:     triamterene-hctz (MAXZIDE-25/DYAZIDE) 37.5-25 MG Tab, Take 1 tablet by mouth once daily, Disp: 90 Tablet, Rfl: 3    metoprolol tartrate (LOPRESSOR) 100 MG Tab, Take 1 tablet by mouth twice daily, Disp: 180 Tablet, Rfl: 3    simvastatin (ZOCOR) 10 MG Tab, Take 1 tablet by mouth once daily, Disp: 90 Tablet, Rfl: 0    hydrocortisone 1 % Cream, Apply 1 g to affected area(s) 2 times a  day. (Patient not taking: Reported on 6/3/2022), Disp: 1 Tube, Rfl: 0    Omeprazole (PRILOSEC PO), Take  by mouth., Disp: , Rfl:     Past Medical History:   Diagnosis Date    Cancer (HCC)     breast    Hypertension        Past Surgical History:   Procedure Laterality Date    HYSTEROSCOPY WITH VIDEO OPERATIVE  8/20/2010    Performed by MAGY GU at [facility]    DILATION AND CURETTAGE  8/20/2010    Performed by MAGY GU at [facility]    MASTECTOMY      rt    PRIMARY C SECTION      x2       No family history on file.  Patient family history was personally reviewed, no pertinent family history to current presentation    Social History     Socioeconomic History    Marital status:      Spouse name: Not on file    Number of children: Not on file    Years of education: Not on file    Highest education level: Not on file   Occupational History    Not on file   Tobacco Use    Smoking status: Never    Smokeless tobacco: Never   Substance and Sexual Activity    Alcohol use: No    Drug use: No    Sexual activity: Not on file   Other Topics Concern    Not on file   Social History Narrative    Not on file     Social Determinants of Health     Financial Resource Strain: Not on file   Food Insecurity: Not on file   Transportation Needs: Not on file   Physical Activity: Not on file   Stress: Not on file   Social Connections: Not on file   Intimate Partner Violence: Not on file   Housing Stability: Not on file       ALLERGIES:  Allergies   Allergen Reactions    Sulfa Drugs        Review of systems:  A complete review of symptoms was reviewed with patient. This is reviewed in H&P and PMH. ALL OTHERS reviewed and negative    Physical exam:  Patient Vitals for the past 24 hrs:   BP Pulse Resp SpO2 Height Weight   09/21/23 2052 121/84 (!) 127 (!) 35 96 % 1.524 m (5') --   09/21/23 2044 130/64 (!) 123 (!) 23 96 % -- --   09/21/23 2037 92/72 (!) 122 (!) 41 96 % -- --    09/21/23 2000 -- -- -- -- -- 90.7 kg (200 lb)     General: No acute distress.   EYES: no jaundice  HEENT: OP clear   Neck: No bruits No JVD.   CVS: RRR. S1 + S2. No M/R/G. No edema.  Resp: CTAB. No wheezing or crackles/rhonchi.  Abdomen: Soft, NT, ND,  Skin: Grossly nothing acute no obvious rashes  Neurological: Alert, Moves all extremities, no cranial nerve defects on limited exam  Extremities: Pulse 2+ in b/l LE. No cyanosis.     Data:  Laboratory studies personally reviewed by me:  No results found for this or any previous visit (from the past 24 hour(s)).    EKG 9/21/23 interpreted by me sinus, inferior infarct recent, reciprocal st depressions  EMS EKG with right sided leads showing sinus V4R ST elevation    All pertinent features of laboratory and imaging reviewed including primary images where applicable      Active Problems:    * No active hospital problems. *  Resolved Problems:    * No resolved hospital problems. *      Assessment / Plan:  77 year old woman with PMH HTN, CKD presents with chest pressure found inf stemi.    -dapt 12 months   -high intensity statin  -BB, ace/arb  -TTE  -urgent LHC  -further recommendations pending results    I personally discussed her case with Dr Gross.     It is my pleasure to participate in the care of Ms. Beth.  Please do not hesitate to contact me with questions or concerns.    Dann Campo MD  Cardiologist [facility]    Madison Beth is critically ill and she is at high risk for clinical deterioration, worsening vital organ dysfunction, and death without the above critical care interventions.  Critical care time 50 minutes. This time was spent at the bedside evaluating the patient's chart, their labs,   imaging, physical exam and discussion with Dr Gross as well as the bedside nurse, family and formulating an assessment and plan.

## 2023-09-22 NOTE — THERAPY
Physical Therapy Contact Note    PT cardiac rehab consult received and acknowledged. EMR indicates patient on strict bed rest until 9/28. Will monitor for appropriateness of therapeutic intervention and re attempt as able.    Rose Becerra, PT, DPT  [phone]

## 2023-09-22 NOTE — PROGRESS NOTES
"[facility] ICU Progress Note      Admit Date: 9/21/2023    Resident(s): Nicolas Collier M.D.   Attending:  SAMARIA ALLEN/ Dr. Braun    Patient ID:    Name:  Madison Beth   YOB: 1945  Age:  77 y.o.  female   MRN:  [id number]    Hospital Course (carried forward and updated):  Per Dr. You, \"77 y.o. female, PMH obesity, HTN, CKD, GERD, remote breast cancer with right mastectomy 1990, who presented 9/21/2023 with reports of 1 week of chest pressure and dyspnea associate with diaphoresis and worse with activity, acutely worse today and EMS was called.  Code STEMI was activated.  She had 3 out of 10 chest pain on arrival and was taken urgently to left heart catheterization.  She was found to have cardiogenic shock, severe ischemic cardiomyopathy with diffuse complex multivessel CAD and had successful PCI to the left main and LAD with SHAWN x1 with angioplasty to the circumflex, unable to deliver stent with residual severe stenosis in the distal LAD, ostial RCA.  An Impella device was placed in Cath Lab she was brought back to the intensive care unit on dobutamine.  She is on room air, lethargic but awake follows all commands and not in distress, able to lie flat in bed.  There is very little pulsatility noted.  Currently in atrial fibrillation.  PA catheter in place with repeat hemodynamics pending.  I placed a left axillary arterial catheter with very little pulsatility noted.\"         Consultants:  Critical Care  Cardiology     Interval Events:  9/22: CO of 2.4, CI of 1.3. Per patient and family, discussed code status. Patient would like to be DNR/DNI. Patient states that he saw her son recently go through hospice and subsequently, would like to be DNR/DNI.    Vitals Range last 24h:  Temp:  [36.1 °C (96.9 °F)-37.2 °C (99 °F)] 37.2 °C (99 °F)  Pulse:  [] 133  Resp:  [14-41] 20  BP: ()/() 117/79  SpO2:  [92 %-97 %] 95 %      Intake/Output Summary (Last 24 hours) at 9/22/2023 1339  Last " data filed at 9/22/2023 1300  Gross per 24 hour   Intake 364.99 ml   Output 1020 ml   Net -655.01 ml        Review of Systems   Constitutional:  Negative for chills, fever and malaise/fatigue.   HENT:  Negative for hearing loss and sore throat.    Eyes:  Negative for blurred vision and double vision.   Respiratory:  Negative for cough, hemoptysis and shortness of breath.    Cardiovascular:  Negative for chest pain, palpitations and leg swelling.   Gastrointestinal:  Negative for abdominal pain, nausea and vomiting.   Genitourinary:  Negative for dysuria and hematuria.   Musculoskeletal:  Positive for back pain. Negative for joint pain and neck pain.   Neurological:  Negative for dizziness and headaches.   Psychiatric/Behavioral:  The patient is not nervous/anxious.        PHYSICAL EXAM:  Vitals:    09/22/23 1230 09/22/23 1245 09/22/23 1300 09/22/23 1315   BP:    117/79   Pulse: (!) 138 (!) 129 (!) 133 (!) 133   Resp:   20 20   Temp:       TempSrc:       SpO2: 95%      Weight:       Height:        Body mass index is 38.36 kg/m².    O2 therapy: Pulse Oximetry: 95 %, O2 Delivery Device: None - Room Air    Date 09/22/23 0700 - 09/23/23 0659   Shift 8277-7145 3815-7420 3621-2263 24 Hour Total   INTAKE   I.V. 145.5   145.5     Norepinephrine Volume 4.2   4.2     Heparin Volume 40.2   40.2     Dobutamine Volume 101   101   Shift Total 145.5   145.5   OUTPUT   Urine 585   585     Indwelling Cathether 585   585   Shift Total 585   585   NET -439.6   -439.6        Physical Exam  Vitals and nursing note reviewed.   Constitutional:       Appearance: She is obese. She is not ill-appearing.   HENT:      Head: Normocephalic and atraumatic.      Right Ear: External ear normal.      Left Ear: External ear normal.      Nose: No rhinorrhea.      Mouth/Throat:      Mouth: Mucous membranes are moist.   Eyes:      Extraocular Movements: Extraocular movements intact.      Conjunctiva/sclera: Conjunctivae normal.   Cardiovascular:       Rate and Rhythm: Normal rate and regular rhythm.      Heart sounds: No murmur heard.     No gallop.      Comments: Impella device interfering with proper auscultation however no overt murmurs or gallops appreciated  Pulmonary:      Effort: Pulmonary effort is normal.      Breath sounds: Normal breath sounds. No wheezing or rales.   Abdominal:      General: Bowel sounds are normal. There is no distension.      Tenderness: There is no abdominal tenderness.   Musculoskeletal:         General: No swelling.      Cervical back: Normal range of motion and neck supple.      Right lower leg: No edema.      Left lower leg: No edema.   Skin:     General: Skin is warm and dry.      Capillary Refill: Capillary refill takes less than 2 seconds.      Coloration: Skin is not jaundiced.   Neurological:      Mental Status: She is alert and oriented to person, place, and time.   Psychiatric:         Mood and Affect: Mood normal.         Behavior: Behavior normal.         Recent Labs     09/21/23 2204 09/22/23  0201   ISTATAPH 7.165* 7.464   ISTATAPCO2 40.7* 24.0*   ISTATAPO2 119* 76   ISTATATCO2 16* 18*   [id number] 97 96   ISTATARTHCO3 14.7* 17.2   ISTATARTBE -13* -4   ISTATTEMP see below 37.0 C   ISTATSPEC Arterial Arterial   ISTATAPHTC  --  7.464   MAKEZDHO8IH  --  76     Recent Labs     09/21/23 2039 09/22/23  0040 09/22/23 0228 09/22/23  1035   SODIUM 133* 135 135 133*   POTASSIUM 4.5 4.3 4.1 3.8   CHLORIDE 97 100 100 99   CO2 15* 20 15* 16*   BUN 32* 35* 37* 42*   CREATININE 1.97* 1.62* 1.75* 1.94*   MAGNESIUM 2.3 2.0  --   --    CALCIUM 9.4 8.0* 7.9* 8.0*     Recent Labs     09/21/23 2039 09/22/23  0040 09/22/23 0228 09/22/23  1035   ALTSGPT 68* 70*  --   --    ASTSGOT 66* 130*  --   --    ALKPHOSPHAT 144* 114*  --   --    TBILIRUBIN 1.0 0.9 1.0  --    DBILIRUBIN  --   --  <0.2  --    GLUCOSE 239* 200* 230* 217*     Recent Labs     09/21/23 2039 09/22/23  0040 09/22/23  0228 09/22/23  1035   RBC 5.08 4.45 3.97*  --     HEMOGLOBIN 14.3 12.4 11.1* 10.3*   HEMATOCRIT 44.4 38.0 33.8* 31.0*   PLATELETCT 301 244 249  --    PROTHROMBTM  --  20.0*  --   --    INR  --  1.68*  --   --      Recent Labs     09/21/23 2039 09/22/23  0040 09/22/23  0228   WBC 17.8* 14.3* 16.0*   NEUTSPOLYS 81.70*  --   --    LYMPHOCYTES 10.40*  --   --    MONOCYTES 6.10  --   --    EOSINOPHILS 0.00  --   --    BASOPHILS 0.00  --   --    ASTSGOT 66* 130*  --    ALTSGPT 68* 70*  --    ALKPHOSPHAT 144* 114*  --    TBILIRUBIN 1.0 0.9 1.0       Meds:   nitroglycerin in D5W  0-200 mcg/min Stopped (09/22/23 0600)    DOBUTamine in D5W  20 mcg/kg/min (Ideal) 20 mcg/kg/min (09/22/23 1310)    furosemide  40 mg      atorvastatin  40 mg      digoxin  250 mcg      NORepinephrine  0-1 mcg/kg/min (Ideal) 0.08 mcg/kg/min (09/22/23 1033)    norepinephrine        insulin regular  1-6 Units      And    dextrose bolus  25 g      LORazepam  0.5 mg      acetaminophen  1,000 mg      aspirin  81 mg      clopidogrel  75 mg      heparin  0-1,000 Units/hr 812 Units/hr (09/22/23 1315)    And    heparin in D5W   Units/hr 88 Units/hr (09/22/23 1300)        Procedures:  9/21: Cardiac cath  9/22: Art line    Imaging:  EC-ECHOCARDIOGRAM LTD W/O CONT   Final Result      EC-ECHOCARDIOGRAM LTD W/O CONT   Final Result      DX-CHEST-PORTABLE (1 VIEW)   Final Result      1.  Line and tube position as described above   2.  Persistently enlarged cardiac silhouette   3.  Pulmonary edema slightly increased since prior      EC-ECHOCARDIOGRAM LTD W/O CONT   Final Result      DX-CHEST-LIMITED (1 VIEW)   Final Result      1.  Mild interstitial pulmonary edema and central vascular congestion   2.  Perihilar and bibasilar opacities likely atelectasis though central pulmonary alveolar edema could have a similar appearance   3.  Persistently enlarged cardiac silhouette   4.  Atherosclerosis      CL-LEFT HEART CATHETERIZATION WITH POSSIBLE INTERVENTION    (Results Pending)       ASSESSEMENT and PLAN:    *  STEMI (ST elevation myocardial infarction) (HCC)- (present on admission)  Assessment & Plan  Inferior STEMI with delayed presentation with diffuse complex multivessel CAD  Successful PCI of left main and LAD with SHAWN x1 and angioplasty to circumflex  Residual severe stenosis in the distal LAD, ostial RCA, ramus and circumflex    Continue antiplatelet therapy  Continue with rosuvastatin  No BB or ACE-I due to hypotension     Coronary artery disease involving native coronary artery of native heart  Assessment & Plan  Please see STEMI for further information    Acute anemia  Assessment & Plan  No sign of bleeding     Continue to follow Hg  Hg goal >8.0   Continue with heparin and DAPT     Lactic acidosis  Assessment & Plan  Likely due to cardiogenic shock     Continue to trend, judicious with IVF    Cardiogenic shock ([facility])  Assessment & Plan  LVEF 20% by ventriculogram  Impella placed in Cath Lab  Per cardiology     Continue to follow hemodynamics  Dobutamine infusion and nitroglycerin initiated per cardiology    Acute renal failure superimposed on stage 3 chronic kidney disease (HCC)- (present on admission)  Assessment & Plan  Limited data points, difficult to ascertain baseline likely in setting of cardiogenic shock  Follow renal function, avoid nephrotoxins  Maintain perfusion, Impella in place  MAP>65, SBP>90    Obesity- (present on admission)  Assessment & Plan  BMI 38  Patient will need weight loss counseling near discharge time      DISPO: Continue ICU stay    CODE STATUS: Full    Quality Measures:  Feeding: Cardiac diet  Analgesia: N/a  Sedation: N/a  Thromboprophylaxis: Heparin  Head of bed: >30 degrees  Ulcer prophylaxis: N/a  Glycemic control: Correctional: Regular 1-6u  Bowel care: bowel regimen  Indwelling lines:   Deescalation of antibiotics: N/a      Nicolas Collier M.D.

## 2023-09-22 NOTE — CONSULTS
Critical Care Consultation    Date of consult: 9/21/2023    Referring Physician  Zak Hutchinson M.D.    Reason for Consultation  Inferior STEMI, delayed presentation, cardiogenic shock post PCI    History of Presenting Illness  77 y.o. female, PMH obesity, HTN, CKD, GERD, remote breast cancer with right mastectomy 1990, who presented 9/21/2023 with reports of 1 week of chest pressure and dyspnea associate with diaphoresis and worse with activity, acutely worse today and EMS was called.  Code STEMI was activated.  She had 3 out of 10 chest pain on arrival and was taken urgently to left heart catheterization.  She was found to have cardiogenic shock, severe ischemic cardiomyopathy with diffuse complex multivessel CAD and had successful PCI to the left main and LAD with SHAWN x1 with angioplasty to the circumflex, unable to deliver stent with residual severe stenosis in the distal LAD, ostial RCA.  An Impella device was placed in Cath Lab she was brought back to the intensive care unit on dobutamine.  She is on room air, lethargic but awake follows all commands and not in distress, able to lie flat in bed.  There is very little pulsatility noted.  Currently in atrial fibrillation.  PA catheter in place with repeat hemodynamics pending.  I placed a left axillary arterial catheter with very little pulsatility noted.    Code Status  Full Code    Review of Systems  Review of Systems   Unable to perform ROS: Acuity of condition       Past Medical History   has a past medical history of Cancer (HCC) and Hypertension.    Surgical History   has a past surgical history that includes primary c section; mastectomy; hysteroscopy with video operative (8/20/2010); and dilation and curettage (8/20/2010).    Family History  family history is not on file.    Social History   reports that she has never smoked. She has never used smokeless tobacco. She reports that she does not drink alcohol and does not use drugs.    Medications  Home  Medications    **Home medications have not yet been reviewed for this encounter**       Current Facility-Administered Medications   Medication Dose Route Frequency Provider Last Rate Last Admin    nitroglycerin 50 mg in D5W 250 ml infusion  0-200 mcg/min Intravenous Continuous Dann Campo M.D. 12 mL/hr at 09/22/23 0055 40 mcg/min at 09/22/23 0055    DOBUTamine (Dobutrex) 1 mg/1 mL premix infusion  10 mcg/kg/min (Ideal) Intravenous Continuous Dann Campo M.D. 27.3 mL/hr at 09/22/23 0042 10 mcg/kg/min at 09/22/23 0042    furosemide (Lasix) injection 40 mg  40 mg Intravenous BID DIURETIC Dann Campo M.D.   40 mg at 09/22/23 0059    aspirin EC tablet 81 mg  81 mg Oral DAILY Terrance Adame M.D.        clopidogrel (Plavix) tablet 75 mg  75 mg Oral DAILY Terrance Adame M.D.        heparin infusion 25,000 Units in 500 mL 0.45% NACL  0-1,000 Units/hr Intravenous Continuous Terrance Adame M.D.        And    heparin 25 units/mL in 500mL D5W infusion (for use with IMPELLA Pump)   Units/hr Other Continuous Terrance Admae M.D.           Allergies  Allergies   Allergen Reactions    Sulfa Drugs        Vital Signs last 24 hours  Temp:  [36.3 °C (97.3 °F)] 36.3 °C (97.3 °F)  Pulse:  [] 47  Resp:  [22-41] 22  BP: ()/() 173/117  SpO2:  [92 %-96 %] 92 %    Physical Exam  Physical Exam  Constitutional:       Appearance: She is ill-appearing.   HENT:      Head: Normocephalic.      Nose: Nose normal.      Mouth/Throat:      Mouth: Mucous membranes are dry.   Eyes:      Pupils: Pupils are equal, round, and reactive to light.   Cardiovascular:      Rate and Rhythm: Tachycardia present. Rhythm irregular.   Pulmonary:      Effort: Pulmonary effort is normal. No respiratory distress.      Breath sounds: No wheezing.   Abdominal:      General: There is distension.      Tenderness: There is no abdominal tenderness.   Musculoskeletal:         General: No swelling.      Cervical back: Neck supple.   Skin:      General: Skin is warm and dry.      Capillary Refill: Capillary refill takes 2 to 3 seconds.   Neurological:      General: No focal deficit present.      Mental Status: She is alert.      Cranial Nerves: No cranial nerve deficit.         Fluids    Intake/Output Summary (Last 24 hours) at 9/22/2023 0143  Last data filed at 9/22/2023 0100  Gross per 24 hour   Intake --   Output 145 ml   Net -145 ml       Laboratory  Recent Results (from the past 48 hour(s))   CBC With Differential    Collection Time: 09/21/23  8:39 PM   Result Value Ref Range    WBC 17.8 (H) 4.8 - 10.8 K/uL    RBC 5.08 4.20 - 5.40 M/uL    Hemoglobin 14.3 12.0 - 16.0 g/dL    Hematocrit 44.4 37.0 - 47.0 %    MCV 87.4 81.4 - 97.8 fL    MCH 28.1 27.0 - 33.0 pg    MCHC 32.2 32.2 - 35.5 g/dL    RDW 49.0 35.9 - 50.0 fL    Platelet Count 301 164 - 446 K/uL    MPV 10.6 9.0 - 12.9 fL    Neutrophils-Polys 81.70 (H) 44.00 - 72.00 %    Lymphocytes 10.40 (L) 22.00 - 41.00 %    Monocytes 6.10 0.00 - 13.40 %    Eosinophils 0.00 0.00 - 6.90 %    Basophils 0.00 0.00 - 1.80 %    Nucleated RBC 0.10 0.00 - 0.20 /100 WBC    Neutrophils (Absolute) 14.70 (H) 1.82 - 7.42 K/uL    Lymphs (Absolute) 1.85 1.00 - 4.80 K/uL    Monos (Absolute) 1.09 (H) 0.00 - 0.85 K/uL    Eos (Absolute) 0.00 0.00 - 0.51 K/uL    Baso (Absolute) 0.00 0.00 - 0.12 K/uL    NRBC (Absolute) 0.02 K/uL   Comp Metabolic Panel    Collection Time: 09/21/23  8:39 PM   Result Value Ref Range    Sodium 133 (L) 135 - 145 mmol/L    Potassium 4.5 3.6 - 5.5 mmol/L    Chloride 97 96 - 112 mmol/L    Co2 15 (L) 20 - 33 mmol/L    Anion Gap 21.0 (H) 7.0 - 16.0    Glucose 239 (H) 65 - 99 mg/dL    Bun 32 (H) 8 - 22 mg/dL    Creatinine 1.97 (H) 0.50 - 1.40 mg/dL    Calcium 9.4 8.5 - 10.5 mg/dL    Correct Calcium 9.5 8.5 - 10.5 mg/dL    AST(SGOT) 66 (H) 12 - 45 U/L    ALT(SGPT) 68 (H) 2 - 50 U/L    Alkaline Phosphatase 144 (H) 30 - 99 U/L    Total Bilirubin 1.0 0.1 - 1.5 mg/dL    Albumin 3.9 3.2 - 4.9 g/dL    Total Protein  7.4 6.0 - 8.2 g/dL    Globulin 3.5 1.9 - 3.5 g/dL    A-G Ratio 1.1 g/dL   Lactic Acid    Collection Time: 09/21/23  8:39 PM   Result Value Ref Range    Lactic Acid 8.0 (HH) 0.5 - 2.0 mmol/L   Magnesium    Collection Time: 09/21/23  8:39 PM   Result Value Ref Range    Magnesium 2.3 1.5 - 2.5 mg/dL   Troponin    Collection Time: 09/21/23  8:39 PM   Result Value Ref Range    Troponin T 725 (H) 6 - 19 ng/L   ESTIMATED GFR    Collection Time: 09/21/23  8:39 PM   Result Value Ref Range    GFR (CKD-EPI) 25 (A) >60 mL/min/1.73 m 2   DIFFERENTIAL MANUAL    Collection Time: 09/21/23  8:39 PM   Result Value Ref Range    Bands-Stabs 0.90 0.00 - 10.00 %    Metamyelocytes 0.90 %    Manual Diff Status PERFORMED    PERIPHERAL SMEAR REVIEW    Collection Time: 09/21/23  8:39 PM   Result Value Ref Range    Peripheral Smear Review see below    PLATELET ESTIMATE    Collection Time: 09/21/23  8:39 PM   Result Value Ref Range    Plt Estimation Normal    MORPHOLOGY    Collection Time: 09/21/23  8:39 PM   Result Value Ref Range    RBC Morphology Normal    POCT activated clotting time device results    Collection Time: 09/21/23  9:25 PM   Result Value Ref Range    Istat Activated Clotting Time 245 (H) 74 - 137 sec   POCT activated clotting time device results    Collection Time: 09/21/23  9:41 PM   Result Value Ref Range    Istat Activated Clotting Time 239 (H) 74 - 137 sec   POCT activated clotting time device results    Collection Time: 09/21/23 10:02 PM   Result Value Ref Range    Istat Activated Clotting Time 323 (H) 74 - 137 sec   POCT arterial blood gas device results    Collection Time: 09/21/23 10:04 PM   Result Value Ref Range    Ph 7.165 (LL) 7.400 - 7.500    Pco2 40.7 (H) 26.0 - 37.0 mmHg    Po2 119 (H) 64 - 87 mmHg    Tco2 16 (L) 20 - 33 mmol/L    S02 97 93 - 99 %    Hco3 14.7 (L) 17.0 - 25.0 mmol/L    BE -13 (L) -4 - 3 mmol/L    Body Temp see below degrees    Specimen Arterial    POCT sodium device results    Collection Time:  23 10:04 PM   Result Value Ref Range    Istat Sodium 134 (L) 135 - 145 mmol/L   POCT potassium device results    Collection Time: 23 10:04 PM   Result Value Ref Range    Istat Potassium 4.3 3.6 - 5.5 mmol/L   POCT ionized CA device results    Collection Time: 23 10:04 PM   Result Value Ref Range    Istat Ionized Calcium 1.19 1.10 - 1.30 mmol/L   POCT activated clotting time device results    Collection Time: 23 10:34 PM   Result Value Ref Range    Istat Activated Clotting Time 287 (H) 74 - 137 sec   POCT activated clotting time device results    Collection Time: 23 11:09 PM   Result Value Ref Range    Istat Activated Clotting Time 305 (H) 74 - 137 sec   CBC without Differential    Collection Time: 23 12:40 AM   Result Value Ref Range    WBC 14.3 (H) 4.8 - 10.8 K/uL    RBC 4.45 4.20 - 5.40 M/uL    Hemoglobin 12.4 12.0 - 16.0 g/dL    Hematocrit 38.0 37.0 - 47.0 %    MCV 85.4 81.4 - 97.8 fL    MCH 27.9 27.0 - 33.0 pg    MCHC 32.6 32.2 - 35.5 g/dL    RDW 46.8 35.9 - 50.0 fL    Platelet Count 244 164 - 446 K/uL    MPV 11.0 9.0 - 12.9 fL   LACTIC ACID    Collection Time: 23 12:40 AM   Result Value Ref Range    Lactic Acid 3.2 (H) 0.5 - 2.0 mmol/L   EKG    Collection Time: 23  1:37 AM   Result Value Ref Range    Report       [facility]    Test Date:  2023  Pt Name:    ROQUE REN              Department: 161  MRN:        [id number]                      Room:       T625  Gender:     Female                       Technician: JASIEL  :        [id number]                   Requested By:ALEC ARBOLEDA  Order #:    [id number]                    Reading MD:    Measurements  Intervals                                Axis  Rate:       133                          P:          0  CT:         0                            QRS:        -85  QRSD:       125                          T:          132  QT:         350  QTc:        521    Interpretive Statements  Atrial  fibrillation  Nonspecific IVCD with LAD  LVH with secondary repolarization abnormality  Probable inferior infarct, recent  Probable anterior infarct, age indeterminate  Compared to ECG 08/11/2010 13:26:39  Intraventricular conduction delay now present  Left ventricular hypertrophy now present  Early repolarization now present  Myocardial infarct fin ding now present  Sinus rhythm no longer present  T-wave abnormality no longer present       Findings   Hemodynamics:   Aorta: 77/54 mmHg  LVEDP: 24 mmHg  No significant pullback gradient across the aortic valve  RA: 32 mmHg  PA: 57/42/46 mmHg  PCWP: 35 mmHg  Cardiac Output/Index (Lore): 1.79 L/min, 0.96 L/min/m2     Coronary Anatomy              Left Main:  Heavily calcified, distal 70% stenosis              LAD:  90% proximal stenosis extending to the midportion of the vessel .  Distally there is 95% stenosis              Ramus: Occluded proximally              LCx:  Proximal 70% and mid 99% stenosis              RCA:  Dominant vessel with heavy calcification at the aorto ostium and 99% stenosis.     Left Ventriculography:   LVEF= 20%.  Global hypokinesis.  2+ mitral regurgitation    Results of intervention to the LAD:  Pre: 90% stenosis and GEENA III flow  Post: 0% residual stenosis and GEENA III flow. No dissection or distal embolization.     Results of intervention to the circumflex:  Pre: 99% stenosis and GEENA III flow  Post: 50% residual stenosis and GEENA III flow. No dissection or distal embolization.     Results of intervention to the left main:  Pre: 70% stenosis and GEENA III flow  Post: 0% residual stenosis and GEENA III flow. No dissection or distal embolization.    IMPRESSIONS:  1. Cardiogenic shock  2.  Severe ischemic cardiomyopathy with diffuse, complex multivessel coronary artery disease  3.  Successful PCI of the left main and the LAD with 1 drug-eluting stent, IVUS guidance  4.  Improvement in stenosis severity in the circumflex using angioplasty, unable  to deliver stent  5.  Residual severe stenosis in the distal LAD, ostial right coronary artery, ramus and circumflex vessel  6.  Successful support of cardiogenic shock using Impella CP device    Imaging  DX-CHEST-PORTABLE (1 VIEW)   Final Result      1.  Line and tube position as described above   2.  Persistently enlarged cardiac silhouette   3.  Pulmonary edema slightly increased since prior      EC-ECHOCARDIOGRAM LTD W/O CONT   Final Result      DX-CHEST-LIMITED (1 VIEW)   Final Result      1.  Mild interstitial pulmonary edema and central vascular congestion   2.  Perihilar and bibasilar opacities likely atelectasis though central pulmonary alveolar edema could have a similar appearance   3.  Persistently enlarged cardiac silhouette   4.  Atherosclerosis      CL-LEFT HEART CATHETERIZATION WITH POSSIBLE INTERVENTION    (Results Pending)   EC-ECHOCARDIOGRAM LTD W/O CONT    (Results Pending)       Assessment/Plan  * STEMI (ST elevation myocardial infarction) ([facility])- (present on admission)  Assessment & Plan  Inferior STEMI with delayed presentation with diffuse complex multivessel CAD  Successful PCI of left main and LAD with SHAWN x1 and angioplasty to circumflex  Residual severe stenosis in the distal LAD, ostial RCA, ramus and circumflex  Continue antiplatelet therapy  Statin and usual post STEMI care, CAD meds    Cardiogenic shock ([facility])  Assessment & Plan  LVEF 20% by ventriculogram  PCWP 35, Lore CI 0.96  Impella placed in Cath Lab, P9, 3.2L flow, minimal pulsatility  Lactic acid 8, improved to 3  Patient awake and mentating normally at this time, continue to follow hemodynamics  Dobutamine infusion and nitroglycerin initiated per cardiology    Stage 3 chronic kidney disease (HCC)- (present on admission)  Assessment & Plan  Follow renal function, avoid nephrotoxins    Obesity- (present on admission)  Assessment & Plan  BMI 38    HTN (hypertension)  Assessment & Plan  Difficult to control historically on multiple  medications  Review home meds        Discussed patient condition and risk of morbidity and/or mortality with RN, RT, Pharmacy, and Cardioilogy .    The patient remains critically ill.  Critical care time = 115 minutes in directly providing and coordinating critical care and extensive data review.  No time overlap and excludes procedures.

## 2023-09-22 NOTE — PROGRESS NOTES
14 cc air removed from TR band. Band removed with minor bleeding at insertion site. Pressure applied for 5 minutes, site dressed with gauze and tape.

## 2023-09-22 NOTE — ED PROVIDER NOTES
ED Provider Note    Primary care provider: James Oshea M.D.    CHIEF COMPLAINT  No chief complaint on file.      Additional history obtained from: EMS, they did a code STEMI on arrival.  They were concerned about ST elevations in lead V2, aVR.  Patient received 325 aspirin prior to arrival.  Limitation to History:  Select: : None    HPI  Madison Beth is a 77 y.o. female who presents to the Emergency Department fatigue, decreased appetite, nausea, small amount of chest pain and neck pain for the past 6 or 7 days.  The patient thought that she might be getting a viral illness but did not have any improvement of symptoms.  She has had very poor p.o. intake.  Denies any fevers or chills.  Denies any abdominal pain, numbness or focal weakness.  Denies any dysuria.  No sick contacts.  Denies any cardiac history.      External Record Review: Very infrequent ER visits, reviewed last outpatient note from earlier this year with the patient went in for a skin lesion.  No mention of prior coronary artery disease.    REVIEW OF SYSTEMS  See HPI.     PAST MEDICAL HISTORY   has a past medical history of Cancer (HCC) and Hypertension.    SURGICAL HISTORY   has a past surgical history that includes primary c section; mastectomy; hysteroscopy with video operative (8/20/2010); and dilation and curettage (8/20/2010).    SOCIAL HISTORY  Social History     Tobacco Use    Smoking status: Never    Smokeless tobacco: Never   Substance Use Topics    Alcohol use: No    Drug use: No      Social History     Substance and Sexual Activity   Drug Use No       FAMILY HISTORY  No family history on file.    CURRENT MEDICATIONS  Reviewed.  See Encounter Summary.     ALLERGIES  Allergies   Allergen Reactions    Sulfa Drugs        PHYSICAL EXAM  VITAL SIGNS: /84   Pulse (!) 127   Resp (!) 35   Ht 1.524 m (5')   Wt 90.7 kg (200 lb)   SpO2 96%   BMI 39.06 kg/m²   Constitutional: Awake, alert in no apparent distress.  HENT:  Normocephalic, Bilateral external ears normal. Nose normal.   Eyes: Conjunctiva normal, non-icteric, EOMI.    Thorax & Lungs: Easy unlabored respirations, Clear to ascultation bilaterally.  Cardiovascular: Tachycardic, No murmurs, rubs or gallops. Bilateral pulses symmetrical.   Abdomen:  Soft, nontender, nondistended, normal active bowel sounds.   :    Skin: Visualized skin is  Dry, No erythema, No rash.   Musculoskeletal:   No cyanosis, clubbing or edema. No leg asymmetry.   Neurologic: Alert, Grossly non-focal.   Psychiatric: Normal affect, Normal mood  Lymphatic:      EKG   12 lead Interpreted by me  Rhythm: Tachycardic  Rate: 120  Axis: normal  Ectopy: none  Conduction: normal  ST Segments: ST elevation in lead II, lead III with reciprocal depressions.    T Waves: no acute change  Clinical Impression: Concerning for acute inferior STEMI.      RADIOLOGY  I have independently interpreted the diagnostic imaging associated with this visit and am waiting the final reading from the radiologist.   My preliminary interpretation is as follows: Appears to have fluid overload/infiltrates.    Radiologist interpretation:   DX-CHEST-LIMITED (1 VIEW)   Final Result      1.  Mild interstitial pulmonary edema and central vascular congestion   2.  Perihilar and bibasilar opacities likely atelectasis though central pulmonary alveolar edema could have a similar appearance   3.  Persistently enlarged cardiac silhouette   4.  Atherosclerosis      CL-LEFT HEART CATHETERIZATION WITH POSSIBLE INTERVENTION    (Results Pending)       COURSE & MEDICAL DECISION MAKING  Pertinent Labs & Imaging studies reviewed. (See chart for details)    COURSE & MEDICAL DECISION MAKING  Pertinent Labs & Imaging studies reviewed. (See chart for details)    Differential diagnoses include but are not limited to: STEMI, sepsis, COVID-19, APRIL    8:25PM- Nursing notes reviewed, patient seen and examined at bedside.    8:50 PM EKG concerning for STEMI, seen in  conjunction with Dr. Campo who is in agreement.      Discussion of management with other medical personnel: Cardiology, case management, hospitalist    Escalation of care considered, and ultimately not performed: Considered CT here in the ER.  She does not have any findings concerning for dissection.  She does appear somewhat septic though will defer full sepsis evaluation until after she returns from the Cath Lab..      Decision tools and prescription drugs considered including, but not limited to: Heart score 8    Decision Making:  This is a pleasant 77 y.o. year old female who presents with fatigue, generalized malaise, nausea, chest pain and neck pain for the past week.  The patient does have ST elevations in the inferior leads with reciprocal changes.  She also presented tachycardic though not hypotensive.  Labs show a white count of 17.8, also critically elevated troponin at 725, elevated creatinine at 1.97 and a decreased bicarb at 15.    Patient currently in the Cath Lab.  Etiology not clear as of yet but anticipate ICU admission pending results of the angiogram, defer to intensivist antibiotic management.  If this does not end up being acute coronary syndrome would need to reevaluate for infectious etiology.    CRITICAL CARE  The very real possibilty of a deterioration of this patient's condition required the highest level of my preparedness for sudden, emergent intervention.  I provided critical care services, which included medication orders, frequent reevaluations of the patient's condition and response to treatment, ordering and reviewing test results, and discussing the case with various consultants.  The critical care time associated with the care of the patient was 30 minutes. Review chart for interventions. This time is exclusive of any other billable procedures.           FINAL IMPRESSION  1. ST elevation myocardial infarction (STEMI), unspecified artery (HCC)

## 2023-09-22 NOTE — PROGRESS NOTES
"Critical Care Progress Note    Date of admission  9/21/2023    Reason for Consultation  Inferior STEMI, delayed presentation, cardiogenic shock post PCI     History of Presenting Illness  \"77 y.o. female, PMH obesity, HTN, CKD, GERD, remote breast cancer with right mastectomy 1990, who presented 9/21/2023 with reports of 1 week of chest pressure and dyspnea associate with diaphoresis and worse with activity, acutely worse today and EMS was called.  Code STEMI was activated.  She had 3 out of 10 chest pain on arrival and was taken urgently to left heart catheterization.  She was found to have cardiogenic shock, severe ischemic cardiomyopathy with diffuse complex multivessel CAD and had successful PCI to the left main and LAD with SHAWN x1 with angioplasty to the circumflex, unable to deliver stent with residual severe stenosis in the distal LAD, ostial RCA.  An Impella device was placed in Cath Lab she was brought back to the intensive care unit on dobutamine.  She is on room air, lethargic but awake follows all commands and not in distress, able to lie flat in bed.  There is very little pulsatility noted.  Currently in atrial fibrillation.  PA catheter in place with repeat hemodynamics pending.  I placed a left axillary arterial catheter with very little pulsatility noted.    Hospital Course  9/22/2023 -patient was stable overnight on the Impella, hemodynamics slightly improved.  Lactic acid being trended, hemoglobin trended due to acute anemia.  Patient reports that overall, she is feeling okay, mildly nauseous and anxious.    Interval Problem Update  Chart review from the past 24 hours includes imaging, laboratory studies, vital signs and notes available.  Pertinent data for today's visit includes    Neuro: intact   Cardiac: tachy, hypotensive on dobutamine  Pulm: On RA  Heme: Hg 14.3 to 11.1  /renal: acidemia improving with hyperventilation, AGMA severe, LA down to 3.5 from 8.0   GI: nausea, improved    Endo: " hyper  ID:  WBC 16k, suspect reactive    I/O: +219/-435=-215  Additional Labs/Imaging:   Additional information: Angio on 9/22 - PCI LAD 1 SHAWN, Angio of circ unable to stent, impella for cardiogenic shock     Vital Signs for last 24 hours   Temp:  [36.1 °C (96.9 °F)-37.2 °C (99 °F)] 37.2 °C (99 °F)  Pulse:  [] 138  Resp:  [14-41] 23  BP: ()/() 107/68  SpO2:  [92 %-97 %] 95 %    Hemodynamic parameters for last 24 hours  CVP:  [8 MM HG-31 MM HG] 8 MM HG  PCWP:  [8 MM HG-31 MM HG] 16 MM HG  CO:  [1.5-2.5] 2.4  CI:  [0.8-1.3] 1.28    Respiratory Information for the last 24 hours     Physical Exam  Vitals reviewed.   Constitutional:       Appearance: Normal appearance.   HENT:      Head: Normocephalic.   Eyes:      Conjunctiva/sclera: Conjunctivae normal.   Cardiovascular:      Rate and Rhythm: Regular rhythm. Bradycardia present.   Pulmonary:      Effort: Pulmonary effort is normal.      Breath sounds: Normal breath sounds.   Abdominal:      Palpations: Abdomen is soft.   Skin:     General: Skin is warm and dry.   Neurological:      General: No focal deficit present.      Mental Status: She is alert and oriented to person, place, and time.   Psychiatric:         Mood and Affect: Mood normal.         Behavior: Behavior normal.     Medications  Current Facility-Administered Medications   Medication Dose Route Frequency Provider Last Rate Last Admin    nitroglycerin 50 mg in D5W 250 ml infusion  0-200 mcg/min Intravenous Continuous Dann Campo M.D.   Stopped at 09/22/23 0600    DOBUTamine (Dobutrex) 1 mg/1 mL premix infusion  20 mcg/kg/min (Ideal) Intravenous Continuous Dann Campo M.D. 54.6 mL/hr at 09/22/23 0856 20 mcg/kg/min at 09/22/23 0856    furosemide (Lasix) injection 40 mg  40 mg Intravenous BID DIURETIC Dann Campo M.D.   40 mg at 09/22/23 0546    atorvastatin (Lipitor) tablet 40 mg  40 mg Oral Q EVENING Sudeep You M.D.        digoxin (Lanoxin) injection 250 mcg  250 mcg  Intravenous Q6HR Dann JOSS Campo   250 mcg at 09/22/23 0915    norepinephrine (Levophed) 8 mg in 250 mL NS infusion (premix)  0-1 mcg/kg/min (Ideal) Intravenous Continuous Sudeep You M.D. 6.8 mL/hr at 09/22/23 1033 0.08 mcg/kg/min at 09/22/23 1033    NOREPINEPHRINE-SODIUM CHLORIDE 8-0.9 MG/250ML-% IV SOLN             acetaminophen (Tylenol) tablet 650 mg  650 mg Oral Q6HRS PRN Nicolas Collier M.D.   650 mg at 09/22/23 0830    insulin regular (HumuLIN R,NovoLIN R) injection  1-6 Units Subcutaneous Q6HRS Chiquita Braun D.O.   1 Units at 09/22/23 1243    And    dextrose 50% (D50W) injection 25 g  25 g Intravenous Q15 MIN PRN Chiquita Braun D.O.        LORazepam (Ativan) tablet 0.5 mg  0.5 mg Oral Q6HRS PRN Nicolas Collier M.D.   0.5 mg at 09/22/23 1013    aspirin EC tablet 81 mg  81 mg Oral DAILY Terrance Adame M.D.   81 mg at 09/22/23 0546    clopidogrel (Plavix) tablet 75 mg  75 mg Oral DAILY Terrance Adame M.D.   75 mg at 09/22/23 0547    heparin infusion 25,000 Units in 500 mL 0.45% NACL  0-1,000 Units/hr Intravenous Continuous Terrance Adame M.D. 16.1 mL/hr at 09/22/23 1212 807 Units/hr at 09/22/23 1212    And    heparin 25 units/mL in 500mL D5W infusion (for use with IMPELLA Pump)   Units/hr Other Continuous Terrance Adame M.D. 3.7 mL/hr at 09/22/23 1200 93 Units/hr at 09/22/23 1200     Fluids    Intake/Output Summary (Last 24 hours) at 9/22/2023 1249  Last data filed at 9/22/2023 1200  Gross per 24 hour   Intake 364.99 ml   Output 970 ml   Net -605.01 ml     Laboratory  Recent Labs     09/21/23  2204 09/22/23  0201   ISTATAPH 7.165* 7.464   ISTATAPCO2 40.7* 24.0*   ISTATAPO2 119* 76   ISTATATCO2 16* 18*   GNEIMIS0ARI 97 96   ISTATARTHCO3 14.7* 17.2   ISTATARTBE -13* -4   ISTATTEMP see below 37.0 C   ISTATSPEC Arterial Arterial   ISTATAPHTC  --  7.464   QETUXBUI8AN  --  76         Recent Labs     09/21/23 2039 09/22/23  0040 09/22/23  0228 09/22/23  1035   SODIUM 133* 135 135 133*    POTASSIUM 4.5 4.3 4.1 3.8   CHLORIDE 97 100 100 99   CO2 15* 20 15* 16*   BUN 32* 35* 37* 42*   CREATININE 1.97* 1.62* 1.75* 1.94*   MAGNESIUM 2.3 2.0  --   --    CALCIUM 9.4 8.0* 7.9* 8.0*     Recent Labs     09/21/23 2039 09/22/23 0040 09/22/23 0228 09/22/23  1035   ALTSGPT 68* 70*  --   --    ASTSGOT 66* 130*  --   --    ALKPHOSPHAT 144* 114*  --   --    TBILIRUBIN 1.0 0.9 1.0  --    DBILIRUBIN  --   --  <0.2  --    GLUCOSE 239* 200* 230* 217*     Recent Labs     09/21/23 2039 09/22/23 0040 09/22/23 0228   WBC 17.8* 14.3* 16.0*   NEUTSPOLYS 81.70*  --   --    LYMPHOCYTES 10.40*  --   --    MONOCYTES 6.10  --   --    EOSINOPHILS 0.00  --   --    BASOPHILS 0.00  --   --    ASTSGOT 66* 130*  --    ALTSGPT 68* 70*  --    ALKPHOSPHAT 144* 114*  --    TBILIRUBIN 1.0 0.9 1.0     Recent Labs     09/21/23 2039 09/22/23 0040 09/22/23 0228 09/22/23  1035   RBC 5.08 4.45 3.97*  --    HEMOGLOBIN 14.3 12.4 11.1* 10.3*   HEMATOCRIT 44.4 38.0 33.8* 31.0*   PLATELETCT 301 244 249  --    PROTHROMBTM  --  20.0*  --   --    INR  --  1.68*  --   --        Imaging  X-Ray:  I have personally reviewed the images and compared with prior images.    Assessment/Plan  * STEMI (ST elevation myocardial infarction) (HCC)- (present on admission)  Assessment & Plan  Inferior STEMI with delayed presentation with diffuse complex multivessel CAD  Successful PCI of left main and LAD with SHAWN x1 and angioplasty to circumflex  Residual severe stenosis in the distal LAD, ostial RCA, ramus and circumflex    Continue antiplatelet therapy  Continue with rosuvastatin  No BB or ACE-I due to hypotension       Coronary artery disease involving native coronary artery of native heart  Assessment & Plan  As above     Acute anemia  Assessment & Plan  No sign of bleeding     Continue to follow Hg  Hg goal >8.0   Continue with heparin and DAPT     Lactic acidosis  Assessment & Plan  Due to cardiogenic shock     Continue to monitor     Cardiogenic shock  (HCC)  Assessment & Plan  LVEF 20% by ventriculogram  PCWP 35, Lore CI 0.96 - initially   Impella placed in Cath Lab, P9, 3.2L flow, minimal pulsatility  Per cardiology     Continue to follow hemodynamics  Dobutamine infusion and nitroglycerin initiated per cardiology    Acute renal failure superimposed on stage 3 chronic kidney disease (HCC)- (present on admission)  Assessment & Plan  Follow renal function, avoid nephrotoxins  Maintain perfusion, Impella in place  MAP>65, SBP>90    Obesity- (present on admission)  Assessment & Plan  BMI 38    HTN (hypertension)  Assessment & Plan  Difficult to control historically on multiple medications  Review home meds    Lines: A line 9/22, Right fem lorrie 9/22, right fem impella   Tubes: delgado 9/22  Diet: NPO, AAT cardiac diet   DVT ppx: on heparin gtt  GI ppx: NA  Bowel regiment: yes      I have performed a physical exam and reviewed and updated ROS and Plan today (9/22/2023). In review of yesterday's note (9/21/2023), there are no changes except as documented above.     Discussed patient condition and risk of morbidity and/or mortality with Family, RN, [employer] resident, Patient, and cardiology    The patient remains critically ill.  Critical care time = 70 minutes in directly providing and coordinating critical care and extensive data review.  No time overlap and excludes procedures.    Chiquita Braun, DO   Pulmonary and Critical Care

## 2023-09-22 NOTE — CARE PLAN
Problem: Pain - Standard  Goal: Alleviation of pain or a reduction in pain to the patient’s comfort goal  Outcome: Progressing     Problem: Knowledge Deficit - Standard  Goal: Patient and family/care givers will demonstrate understanding of plan of care, disease process/condition, diagnostic tests and medications  Outcome: Progressing     Problem: Psychosocial  Goal: Patient's level of anxiety will decrease  Outcome: Progressing     Problem: Hemodynamics  Goal: Patient's hemodynamics, fluid balance and neurologic status will be stable or improve  Outcome: Progressing     Problem: Urinary Elimination  Goal: Establish and maintain regular urinary output  Outcome: Progressing     Problem: Skin Integrity  Goal: Skin integrity is maintained or improved  Outcome: Progressing

## 2023-09-23 PROBLEM — R73.9 HYPERGLYCEMIA: Status: ACTIVE | Noted: 2023-01-01

## 2023-09-23 NOTE — PROGRESS NOTES
"Critical Care Progress Note    Date of admission  9/21/2023    Reason for Consultation  Inferior STEMI, delayed presentation, cardiogenic shock post PCI     History of Presenting Illness  \"77 y.o. female, PMH obesity, HTN, CKD, GERD, remote breast cancer with right mastectomy 1990, who presented 9/21/2023 with reports of 1 week of chest pressure and dyspnea associate with diaphoresis and worse with activity, acutely worse today and EMS was called.  Code STEMI was activated.  She had 3 out of 10 chest pain on arrival and was taken urgently to left heart catheterization.  She was found to have cardiogenic shock, severe ischemic cardiomyopathy with diffuse complex multivessel CAD and had successful PCI to the left main and LAD with SHAWN x1 with angioplasty to the circumflex, unable to deliver stent with residual severe stenosis in the distal LAD, ostial RCA.  An Impella device was placed in Cath Lab she was brought back to the intensive care unit on dobutamine.  She is on room air, lethargic but awake follows all commands and not in distress, able to lie flat in bed.  There is very little pulsatility noted.  Currently in atrial fibrillation.  PA catheter in place with repeat hemodynamics pending.  I placed a left axillary arterial catheter with very little pulsatility noted.    Hospital Course  9/22/2023 -patient was stable overnight on the Impella, hemodynamics slightly improved.  Lactic acid being trended, hemoglobin trended due to acute anemia.  Patient reports that overall, she is feeling okay, mildly nauseous and anxious.  9/23/2023 - SVR and UOP overnight.  Patient was given 1 L of IV fluids and started on vasopressin.  This morning, patient is more delirious, says that she wants to go home and that she does not want to have the Impella in any longer.    Interval Problem Update  Chart review from the past 24 hours includes imaging, laboratory studies, vital signs and notes available.  Pertinent data for today's " visit includes    Neuro: intact   Cardiac: a fib tachy, hemodynamics improving   Pulm: On 3lpm  Heme: Hg 14.3 on admission to 8.3 today  /renal: Cr improving from 2.52 to 2.4. LA down to 2.4 from 5.2 yesterday  GI: nausea, improved    Endo: hyperglycemic, BG not controlled, received 8 units ICS in last 24  ID:  WBC 16k, up to 21.2  I/O: +3501/-1475=+2026  Additional Labs/Imaging:   Additional information: Angio on 9/22 - PCI LAD 1 SHAWN, Angio of circ unable to stent, impella for cardiogenic shock     Vital Signs for last 24 hours   Temp:  [36.9 °C (98.4 °F)-37.5 °C (99.5 °F)] 37.5 °C (99.5 °F)  Pulse:  [] 93  Resp:  [13-27] 21  BP: ()/(38-86) 103/58  SpO2:  [91 %-98 %] 97 %    Hemodynamic parameters for last 24 hours  CVP:  [4 MM HG-11 MM HG] 8 MM HG  PCWP:  [6 MM HG-15 MM HG] 9 MM HG  CO:  [2.77-4.2] 3.65  CI:  [1.48-2.25] 1.95    Respiratory Information for the last 24 hours     Physical Exam  Vitals reviewed.   Constitutional:       Appearance: Normal appearance.   HENT:      Head: Normocephalic.   Eyes:      Conjunctiva/sclera: Conjunctivae normal.   Cardiovascular:      Rate and Rhythm: Regular rhythm. Tachycardia present.   Pulmonary:      Effort: Pulmonary effort is normal.      Breath sounds: Normal breath sounds.   Abdominal:      Palpations: Abdomen is soft.   Skin:     General: Skin is warm and dry.   Neurological:      General: No focal deficit present.      Mental Status: She is alert and oriented to person, place, and time.   Psychiatric:         Mood and Affect: Mood normal.         Behavior: Behavior normal.     Medications  Current Facility-Administered Medications   Medication Dose Route Frequency Provider Last Rate Last Admin    insulin GLARGINE (Lantus,Semglee) injection  15 Units Subcutaneous QAM INSULIN Chiquita Braun D.O.   15 Units at 09/23/23 0803    insulin regular (HumuLIN R,NovoLIN R) injection  2-9 Units Subcutaneous 4X/DAY ACHS Chiquita Braun D.O.        And     dextrose 50% (D50W) injection 25 g  25 g Intravenous Q15 MIN PRN Chiquita Braun D.O.        pantoprazole (Protonix) injection 40 mg  40 mg Intravenous BID Chiquita Braun D.O.   40 mg at 09/23/23 0807    nitroglycerin 50 mg in D5W 250 ml infusion  0-200 mcg/min Intravenous Continuous Dann JOSS Campo   Stopped at 09/22/23 0600    furosemide (Lasix) injection 40 mg  40 mg Intravenous BID DIURETIC Nicolas Collier M.D.   40 mg at 09/22/23 0546    atorvastatin (Lipitor) tablet 40 mg  40 mg Oral Q EVENING Sudeep You M.D.   40 mg at 09/22/23 1713    norepinephrine (Levophed) 8 mg in 250 mL NS infusion (premix)  0-1 mcg/kg/min (Ideal) Intravenous Continuous Sudeep You M.D. 13.7 mL/hr at 09/23/23 0458 0.16 mcg/kg/min at 09/23/23 0458    LORazepam (Ativan) tablet 0.5 mg  0.5 mg Oral Q6HRS PRN Nciolas Collier M.D.   0.5 mg at 09/22/23 1013    acetaminophen (Tylenol) tablet 1,000 mg  1,000 mg Oral Q6HRS PRN Nicolas Collier M.D.   1,000 mg at 09/22/23 2015    dexmedetomidine (PRECEDEX) 400 mcg/100mL NS premix infusion  0.1-1.5 mcg/kg/hr (Ideal) Intravenous Continuous Chiquita Braun D.O. 2.3 mL/hr at 09/22/23 1611 0.2 mcg/kg/hr at 09/22/23 1611    oxyCODONE immediate-release (Roxicodone) tablet 2.5 mg  2.5 mg Oral Q4HRS PRN Gene Castillo M.D.   2.5 mg at 09/22/23 1713    vasopressin (Vasostrict) 20 Units in  mL Infusion  0.03 Units/min Intravenous Continuous Omi Miranda D.O. 9 mL/hr at 09/23/23 0434 0.03 Units/min at 09/23/23 0434    DOBUTamine (Dobutrex) 1 mg/1 mL premix infusion  15 mcg/kg/min (Ideal) Intravenous Continuous Gene Molina Jr., D.ORian 41 mL/hr at 09/23/23 0413 15 mcg/kg/min at 09/23/23 0413    aspirin EC tablet 81 mg  81 mg Oral DAILY Terrance Adame M.D.   81 mg at 09/23/23 0543    clopidogrel (Plavix) tablet 75 mg  75 mg Oral DAILY Terrance Adame M.D.   75 mg at 09/23/23 0543    heparin infusion 25,000 Units in 500 mL 0.45% NACL  0-1,000 Units/hr Intravenous Continuous  Terrance Adame M.D. 23.4 mL/hr at 09/23/23 0700 1,170 Units/hr at 09/23/23 0700    And    heparin 25 units/mL in 500mL D5W infusion (for use with IMPELLA Pump)   Units/hr Other Continuous Terrance Adame M.D. 3.2 mL/hr at 09/23/23 0700 80 Units/hr at 09/23/23 0700     Fluids    Intake/Output Summary (Last 24 hours) at 9/23/2023 0813  Last data filed at 9/23/2023 0700  Gross per 24 hour   Intake 3416.36 ml   Output 1265 ml   Net 2151.36 ml       Laboratory  Recent Labs     09/22/23  0201 09/22/23  2152 09/22/23  2158 09/23/23  0136   ISTATAPH 7.464 7.427  --  7.407   ISTATAPCO2 24.0* 26.5  --  29.3   ISTATAPO2 76 87  --  68   ISTATATCO2 18* 18*  --  19*   FJRTNPF4SOK 96 97  --  94   ISTATARTHCO3 17.2 17.5  --  18.4   ISTATARTBE -4 -6*  --  -5*   ISTATTEMP 37.0 C 36.9 C 36.9 C 37.4 C   ISTATFIO2  --   --   --  28   ISTATSPEC Arterial Arterial Venous Arterial   ISTATAPHTC 7.464 7.429  --  7.401   CAQOCYXW2VW 76 87  --  70           Recent Labs     09/21/23  2039 09/22/23  0040 09/22/23  0228 09/22/23  1730 09/23/23  0215 09/23/23  0530   SODIUM 133* 135   < > 131* 130* 130*   POTASSIUM 4.5 4.3   < > 3.9 3.8 3.8   CHLORIDE 97 100   < > 97 96 98   CO2 15* 20   < > 15* 16* 16*   BUN 32* 35*   < > 45* 46* 43*   CREATININE 1.97* 1.62*   < > 2.40* 2.52* 2.40*   MAGNESIUM 2.3 2.0  --   --   --  1.6   CALCIUM 9.4 8.0*   < > 8.3* 7.9* 8.0*    < > = values in this interval not displayed.       Recent Labs     09/21/23 2039 09/22/23 0040 09/22/23 0228 09/22/23 1035 09/22/23 1730 09/23/23 0215 09/23/23  0530   ALTSGPT 68* 70*  --   --   --   --  58*   ASTSGOT 66* 130*  --   --   --   --  207*   ALKPHOSPHAT 144* 114*  --   --   --   --  85   TBILIRUBIN 1.0 0.9 1.0  --   --   --  1.9*   DBILIRUBIN  --   --  <0.2  --   --   --  0.4   GLUCOSE 239* 200* 230*   < > 222* 255* 272*    < > = values in this interval not displayed.       Recent Labs     09/21/23 2039 09/22/23 0040 09/22/23 0228 09/22/23 1730 09/23/23 0215  09/23/23  0530   WBC 17.8* 14.3* 16.0* 21.2* 19.5*  --    NEUTSPOLYS 81.70*  --   --   --   --   --    LYMPHOCYTES 10.40*  --   --   --   --   --    MONOCYTES 6.10  --   --   --   --   --    EOSINOPHILS 0.00  --   --   --   --   --    BASOPHILS 0.00  --   --   --   --   --    ASTSGOT 66* 130*  --   --   --  207*   ALTSGPT 68* 70*  --   --   --  58*   ALKPHOSPHAT 144* 114*  --   --   --  85   TBILIRUBIN 1.0 0.9 1.0  --   --  1.9*       Recent Labs     09/22/23  0040 09/22/23  0228 09/22/23  1035 09/22/23  1730 09/22/23  2145 09/23/23  0215   RBC 4.45 3.97*  --  3.27*  --  2.95*   HEMOGLOBIN 12.4 11.1*   < > 9.5* 9.2* 8.3*   HEMATOCRIT 38.0 33.8*   < > 27.8* 27.0* 25.1*   PLATELETCT 244 249  --  161*  --  152*   PROTHROMBTM 20.0*  --   --   --   --   --    INR 1.68*  --   --   --   --   --     < > = values in this interval not displayed.       Imaging  X-Ray:  I have personally reviewed the images and compared with prior images.    Assessment/Plan  * STEMI (ST elevation myocardial infarction) (HCC)- (present on admission)  Assessment & Plan  Inferior STEMI with delayed presentation with diffuse complex multivessel CAD  Successful PCI of left main and LAD with SHAWN x1 and angioplasty to circumflex  Residual severe stenosis in the distal LAD, ostial RCA, ramus and circumflex    Continue antiplatelet therapy  Continue with rosuvastatin  No BB or ACE-I due to hypotension       Hyperglycemia  Assessment & Plan  No known history of diabetes    Start Lantus 15 units every morning  Increase insulin correction scale to medium  Follow-up hemoglobin A1c  Maintain glucose between 140-180, overnight able to achieve this later in the day we will need to start patient on an insulin drip    Coronary artery disease involving native coronary artery of native heart  Assessment & Plan  As above     Acute anemia  Assessment & Plan  No sign of bleeding     Continue to follow Hg, down to 8.2 today   Type and screen today   Hg goal >8.0    Continue with heparin and DAPT     Lactic acidosis  Assessment & Plan  Due to cardiogenic shock     Continue to monitor     Cardiogenic shock (HCC)  Assessment & Plan  LVEF 20% by ventriculogram  PCWP 35, Lore CI 0.96 - initially   Impella placed in Cath Lab, P9, 3.2L flow    Continue to follow hemodynamics  Dobutamine infusion and levophed for hemodynamic support   Vasopressin DCed  Will continue to monitor for improvement, she is not a candidate for a repeat PCI or CABG    Acute renal failure superimposed on stage 3 chronic kidney disease (HCC)- (present on admission)  Assessment & Plan  Follow renal function, avoid nephrotoxins  Maintain perfusion, Impella in place  MAP>65, SBP>90    Obesity- (present on admission)  Assessment & Plan  BMI 38    HTN (hypertension)  Assessment & Plan  Difficult to control historically on multiple medications  Review home meds after shock resolves    Lines: A line 9/22, Right fem lorrie 9/22, right fem impella   Tubes: delgado 9/22  Diet: NPO, AAT cardiac diet   DVT ppx: on heparin gtt  GI ppx: NA  Bowel regiment: yes      I have performed a physical exam and reviewed and updated ROS and Plan today (9/23/2023). In review of yesterday's note (9/22/2023), there are no changes except as documented above.     Discussed patient condition and risk of morbidity and/or mortality with Family, RN, [facility] resident, Patient, and cardiology    The patient remains critically ill.  Critical care time = 53 minutes in directly providing and coordinating critical care and extensive data review.  No time overlap and excludes procedures.    Chiquita Braun, DO   Pulmonary and Critical Care

## 2023-09-23 NOTE — CARE PLAN
The patient is Unstable - High likelihood or risk of patient condition declining or worsening    Shift Goals  Clinical Goals: Maintain impella positioning  Patient Goals: Rest, 'feel better'  Family Goals: TIFFANIE    Progress made toward(s) clinical / shift goals:  Tolerated decrease to P8. No bleed on CT. Improved pain compared to start of shift.   Problem: Pain - Standard  Goal: Alleviation of pain or a reduction in pain to the patient’s comfort goal  Outcome: Progressing     Problem: Knowledge Deficit - Standard  Goal: Patient and family/care givers will demonstrate understanding of plan of care, disease process/condition, diagnostic tests and medications  Outcome: Progressing     Problem: Hemodynamics  Goal: Patient's hemodynamics, fluid balance and neurologic status will be stable or improve  Outcome: Progressing     Problem: Urinary - Renal Perfusion  Goal: Ability to achieve and maintain adequate renal perfusion and functioning will improve  Outcome: Progressing       Patient is not progressing towards the following goals: HGB trending down. Worsening kidney function, was up to 0.5 mcg/kg/min levo. AO x 1-2    Monitor Summary:  Wide complex A-Fib rate of 88-150s

## 2023-09-23 NOTE — PROGRESS NOTES
Critical Care Follow Up Note    I had a meeting with the patient's , son, and daughter.  We discussed patient's prognosis and current treatments.  Her arterial line had stopped working and I mentioned that we may need to replace it.  Family stated that they did not want her to have any further procedures, this is in keeping with what the patient had told me earlier in the day.  Patient's family states he would not like to escalate care at all, and eventually decided that they would like to do comfort measures only.  Family was at bedside and comfort care measures were initiated.    Chiquita Braun, DO   Pulmonary and Critical Care

## 2023-09-23 NOTE — PROGRESS NOTES
Patient transported to CT scanner with Primary RN, charge RN, and Tech. Patient required uptitration of levo at bedside prior to transport when angle of bed decreased from reverse trendelenburg to flat.   Back to room roughly 2110

## 2023-09-23 NOTE — ASSESSMENT & PLAN NOTE
No known history of diabetes    Start Lantus 15 units every morning  Increase insulin correction scale to medium  Follow-up hemoglobin A1c  Maintain glucose between 140-180, overnight able to achieve this later in the day we will need to start patient on an insulin drip

## 2023-09-23 NOTE — PROGRESS NOTES
Discussed plan of care in interdisciplinary rounds. Lasix held per Dr. Braun. Vaso D/Lambert, orders to titrate levo as order, MAP goal >65. 1 unit PRBC ordered.    0800 Biggers numbers reviewed. Dobutamine at set rate.

## 2023-09-23 NOTE — PROGRESS NOTES
Critical Care Follow Up Note    I had a meeting with the patient's , son, and daughter.  We discussed patient's prognosis and current treatments.  Her arterial line had stopped working and I mentioned that we may need to replace it.  Family stated that they did not want her to have any further procedures, this is in keeping with what the patient had told me earlier in the day.  Patient's family states he would not like to escalate care at all, and eventually decided that they would like to do comfort measures only.  Family was at bedside and comfort care measures were initiated.    Additional critical care time: 27 min    Chiquita Braun, DO   Pulmonary and Critical Care

## 2023-09-23 NOTE — PROGRESS NOTES
[facility] Cardiology Follow-up Consult Note  Date of note:    9/23/2023          Patient ID:  Name:   Madison Beth   YOB: 1945  Age:   77 y.o.  female   MRN:   [id number]    Chief complaint chest pain shortness of breath    Interim Events:  Patient had reduced cardiac index pressors have been changed by her CVP is low at 5 with urine output around 30 cc an hour she is getting 100 cc of IV fluids with her maintenance drips pressors      ROS  Did not obtain due to goals of care    Past medical, surgical, social, and family history reviewed and unchanged from admission except as noted in assessment and plan.    Medications: Reviewed in MAR  Current Facility-Administered Medications   Medication Dose Frequency Provider Last Rate Last Admin    NS infusion   Continuous Kade Suarez M.D.        nitroglycerin 50 mg in D5W 250 ml infusion  0-200 mcg/min Continuous Dann JOSS Campo   Stopped at 09/22/23 0600    furosemide (Lasix) injection 40 mg  40 mg BID DIURETIC Nicolas Collier M.D.   40 mg at 09/22/23 0546    atorvastatin (Lipitor) tablet 40 mg  40 mg Q EVENING Sudeep You M.D.   40 mg at 09/22/23 1713    norepinephrine (Levophed) 8 mg in 250 mL NS infusion (premix)  0-1 mcg/kg/min (Ideal) Continuous Sudeep You M.D. 15.4 mL/hr at 09/22/23 1806 0.18 mcg/kg/min at 09/22/23 1806    insulin regular (HumuLIN R,NovoLIN R) injection  1-6 Units Q6HRS Chiquita Braun D.O.   2 Units at 09/22/23 1734    And    dextrose 50% (D50W) injection 25 g  25 g Q15 MIN PRN Chiquita Braun D.O.        LORazepam (Ativan) tablet 0.5 mg  0.5 mg Q6HRS PRN Nicolas Collier M.D.   0.5 mg at 09/22/23 1013    acetaminophen (Tylenol) tablet 1,000 mg  1,000 mg Q6HRS PRN Nicolas Collier M.D.   1,000 mg at 09/22/23 2015    dexmedetomidine (PRECEDEX) 400 mcg/100mL NS premix infusion  0.1-1.5 mcg/kg/hr (Ideal) Continuous Chiquita Braun D.O. 2.3 mL/hr at 09/22/23 1611 0.2 mcg/kg/hr at 09/22/23 1611    oxyCODONE  immediate-release (Roxicodone) tablet 2.5 mg  2.5 mg Q4HRS PRN Gene Castillo M.D.   2.5 mg at 09/22/23 1713    vasopressin (Vasostrict) 20 Units in  mL Infusion  0.03 Units/min Continuous Omi Miranda D.O. 9 mL/hr at 09/22/23 2351 0.03 Units/min at 09/22/23 2351    DOBUTamine (Dobutrex) 1 mg/1 mL premix infusion  15 mcg/kg/min (Ideal) Continuous Gene Molina Jr. D.ORian        aspirin EC tablet 81 mg  81 mg DAILY Terrance Adame M.D.   81 mg at 09/22/23 0546    clopidogrel (Plavix) tablet 75 mg  75 mg DAILY Terrance Adame M.D.   75 mg at 09/22/23 0547    heparin infusion 25,000 Units in 500 mL 0.45% NACL  0-1,000 Units/hr Continuous Terrance Adame M.D. 20.3 mL/hr at 09/22/23 2312 1,015 Units/hr at 09/22/23 2312    And    heparin 25 units/mL in 500mL D5W infusion (for use with IMPELLA Pump)   Units/hr Continuous Terrance Adame M.D. 3.4 mL/hr at 09/22/23 2310 85 Units/hr at 09/22/23 2310   Last reviewed on 6/3/2022 10:57 AM by James Oshea M.D.   Allergies   Allergen Reactions    Sulfa Drugs        Physical Exam  Body mass index is 38.36 kg/m². /66   Pulse (!) 116   Temp 36.9 °C (98.4 °F) (Core)   Resp 19   Ht 1.524 m (5')   Wt 89.1 kg (196 lb 6.9 oz)   SpO2 95%    Vitals:    09/22/23 2315 09/22/23 2330 09/22/23 2345 09/23/23 0000   BP: (!) 89/60 103/65 96/53 138/66   Pulse: (!) 134 (!) 113 (!) 121 (!) 116   Resp: (!) 21 19 18 19   Temp:       TempSrc:       SpO2: 96% 95% 94% 95%   Weight:       Height:        Oxygen Therapy:  Pulse Oximetry: 95 %, O2 (LPM): 1, O2 Delivery Device: Silicone Nasal Cannula    General: Appears acutely ill but comfortable  Eyes: nl conjunctiva  ENT: OP clear  Neck: no JVD   Lungs: normal respiratory effort  Heart: Irregular with A-fib rates improved to 110  EXT [moderate edema bilateral lower extremities.  no cyanosis  Abdomen: soft, non tender, non distended,  Neurological: No focal deficits  Psychiatric: Appropriate affect,   Skin: Cool  extremities  NICCI has some blood in her urine currently clearing     Labs (personally reviewed and notable for):   Recent Results (from the past 24 hour(s))   CBC without Differential    Collection Time: 09/22/23 12:40 AM   Result Value Ref Range    WBC 14.3 (H) 4.8 - 10.8 K/uL    RBC 4.45 4.20 - 5.40 M/uL    Hemoglobin 12.4 12.0 - 16.0 g/dL    Hematocrit 38.0 37.0 - 47.0 %    MCV 85.4 81.4 - 97.8 fL    MCH 27.9 27.0 - 33.0 pg    MCHC 32.6 32.2 - 35.5 g/dL    RDW 46.8 35.9 - 50.0 fL    Platelet Count 244 164 - 446 K/uL    MPV 11.0 9.0 - 12.9 fL   Comp Metabolic Panel (CMP)    Collection Time: 09/22/23 12:40 AM   Result Value Ref Range    Sodium 135 135 - 145 mmol/L    Potassium 4.3 3.6 - 5.5 mmol/L    Chloride 100 96 - 112 mmol/L    Co2 20 20 - 33 mmol/L    Anion Gap 15.0 7.0 - 16.0    Glucose 200 (H) 65 - 99 mg/dL    Bun 35 (H) 8 - 22 mg/dL    Creatinine 1.62 (H) 0.50 - 1.40 mg/dL    Calcium 8.0 (L) 8.5 - 10.5 mg/dL    Correct Calcium 8.6 8.5 - 10.5 mg/dL    AST(SGOT) 130 (H) 12 - 45 U/L    ALT(SGPT) 70 (H) 2 - 50 U/L    Alkaline Phosphatase 114 (H) 30 - 99 U/L    Total Bilirubin 0.9 0.1 - 1.5 mg/dL    Albumin 3.2 3.2 - 4.9 g/dL    Total Protein 6.2 6.0 - 8.2 g/dL    Globulin 3.0 1.9 - 3.5 g/dL    A-G Ratio 1.1 g/dL   Magnesium    Collection Time: 09/22/23 12:40 AM   Result Value Ref Range    Magnesium 2.0 1.5 - 2.5 mg/dL   proBrain Natriuretic Peptide, NT    Collection Time: 09/22/23 12:40 AM   Result Value Ref Range    NT-proBNP 78751 (H) 0 - 125 pg/mL   Prothrombin time (INR)    Collection Time: 09/22/23 12:40 AM   Result Value Ref Range    PT 20.0 (H) 12.0 - 14.6 sec    INR 1.68 (H) 0.87 - 1.13   LACTIC ACID    Collection Time: 09/22/23 12:40 AM   Result Value Ref Range    Lactic Acid 3.2 (H) 0.5 - 2.0 mmol/L   ESTIMATED GFR    Collection Time: 09/22/23 12:40 AM   Result Value Ref Range    GFR (CKD-EPI) 32 (A) >60 mL/min/1.73 m 2   EKG    Collection Time: 09/22/23  1:37 AM   Result Value Ref Range    Report        [facility] Cardiology    Test Date:  2023  Pt Name:    ROQUE REN              Department: 161  MRN:        [id number]                      Room:       T625  Gender:     Female                       Technician: MISBAH  :        1945                   Requested By:ALEC ARBOLEDA  Order #:    [id number]                    Reading MD:    Measurements  Intervals                                Axis  Rate:       133                          P:          0  CA:         0                            QRS:        -85  QRSD:       125                          T:          132  QT:         350  QTc:        521    Interpretive Statements  Atrial fibrillation  Nonspecific IVCD with LAD  LVH with secondary repolarization abnormality  Probable inferior infarct, recent  Probable anterior infarct, age indeterminate  Compared to ECG 2010 13:26:39  Intraventricular conduction delay now present  Left ventricular hypertrophy now present  Early repolarization now present  Myocardial infarct fin ding now present  Sinus rhythm no longer present  T-wave abnormality no longer present     EKG STAT    Collection Time: 23  1:37 AM   Result Value Ref Range    Report       [facility]    Test Date:  2023  Pt Name:    ROQUE REN              Department: 161  MRN:        [id number]                      Room:       T625  Gender:     Female                       Technician: [facility]  :        1945                   Requested By:DANN MARK  Order #:    [id number]                    Reading MD: Dann Mark MD    Measurements  Intervals                                Axis  Rate:       133                          P:          0  CA:         0                            QRS:        -85  QRSD:       125                          T:          132  QT:         350  QTc:        521    Interpretive Statements  Atrial fibrillation  Nonspecific IVCD with LAD  LVH with secondary repolarization abnormality  Probable  inferior infarct, recent  Probable anterior infarct, age indeterminate  Electronically Signed On 09- 06:31:26 PDT by Dann Campo MD     POCT activated clotting time device results    Collection Time: 09/22/23  1:53 AM   Result Value Ref Range    Istat Activated Clotting Time 203 (H) 74 - 137 sec   POCT arterial blood gas device results    Collection Time: 09/22/23  2:01 AM   Result Value Ref Range    Ph 7.464 7.400 - 7.500    Pco2 24.0 (L) 26.0 - 37.0 mmHg    Po2 76 64 - 87 mmHg    Tco2 18 (L) 20 - 33 mmol/L    S02 96 93 - 99 %    Hco3 17.2 17.0 - 25.0 mmol/L    BE -4 -4 - 3 mmol/L    Body Temp 37.0 C degrees    Ph Temp Billy 7.464 7.400 - 7.500    Pco2 Temp Co 24.0 (L) 26.0 - 37.0 mmHg    Po2 Temp Cor 76 64 - 87 mmHg    Specimen Arterial     DelSys Other    POCT lactate device results    Collection Time: 09/22/23  2:01 AM   Result Value Ref Range    iStat Lactate 3.6 (H) 0.5 - 2.0 mmol/L   Basic Metabolic Panel (BMP)    Collection Time: 09/22/23  2:28 AM   Result Value Ref Range    Sodium 135 135 - 145 mmol/L    Potassium 4.1 3.6 - 5.5 mmol/L    Chloride 100 96 - 112 mmol/L    Co2 15 (L) 20 - 33 mmol/L    Glucose 230 (H) 65 - 99 mg/dL    Bun 37 (H) 8 - 22 mg/dL    Creatinine 1.75 (H) 0.50 - 1.40 mg/dL    Calcium 7.9 (L) 8.5 - 10.5 mg/dL    Anion Gap 20.0 (H) 7.0 - 16.0   CBC without differential    Collection Time: 09/22/23  2:28 AM   Result Value Ref Range    WBC 16.0 (H) 4.8 - 10.8 K/uL    RBC 3.97 (L) 4.20 - 5.40 M/uL    Hemoglobin 11.1 (L) 12.0 - 16.0 g/dL    Hematocrit 33.8 (L) 37.0 - 47.0 %    MCV 85.1 81.4 - 97.8 fL    MCH 28.0 27.0 - 33.0 pg    MCHC 32.8 32.2 - 35.5 g/dL    RDW 47.5 35.9 - 50.0 fL    Platelet Count 249 164 - 446 K/uL    MPV 11.4 9.0 - 12.9 fL   Bilirubin (Direct)    Collection Time: 09/22/23  2:28 AM   Result Value Ref Range    Direct Bilirubin <0.2 0.1 - 0.5 mg/dL   Bilirubin (Indirect)    Collection Time: 09/22/23  2:28 AM   Result Value Ref Range    Indirect Bilirubin see below  0.0 - 1.0 mg/dL   Bilirubin Total    Collection Time: 09/22/23  2:28 AM   Result Value Ref Range    Total Bilirubin 1.0 0.1 - 1.5 mg/dL   LDH    Collection Time: 09/22/23  2:28 AM   Result Value Ref Range    LDH Total 741 (H) 107 - 266 U/L   ESTIMATED GFR    Collection Time: 09/22/23  2:28 AM   Result Value Ref Range    GFR (CKD-EPI) 29 (A) >60 mL/min/1.73 m 2   POCT activated clotting time device results    Collection Time: 09/22/23  3:08 AM   Result Value Ref Range    Istat Activated Clotting Time 167 (H) 74 - 137 sec   Lactic Acid    Collection Time: 09/22/23  5:55 AM   Result Value Ref Range    Lactic Acid 3.5 (H) 0.5 - 2.0 mmol/L   POCT activated clotting time device results    Collection Time: 09/22/23  7:11 AM   Result Value Ref Range    Istat Activated Clotting Time 161 (H) 74 - 137 sec   EC-ECHOCARDIOGRAM LTD W/O CONT    Collection Time: 09/22/23  9:20 AM   Result Value Ref Range    Left Ventrical Ejection Fraction 20    Lactic Acid    Collection Time: 09/22/23 10:35 AM   Result Value Ref Range    Lactic Acid 5.0 (HH) 0.5 - 2.0 mmol/L   HEMOGLOBIN AND HEMATOCRIT    Collection Time: 09/22/23 10:35 AM   Result Value Ref Range    Hemoglobin 10.3 (L) 12.0 - 16.0 g/dL    Hematocrit 31.0 (L) 37.0 - 47.0 %   Basic Metabolic Panel    Collection Time: 09/22/23 10:35 AM   Result Value Ref Range    Sodium 133 (L) 135 - 145 mmol/L    Potassium 3.8 3.6 - 5.5 mmol/L    Chloride 99 96 - 112 mmol/L    Co2 16 (L) 20 - 33 mmol/L    Glucose 217 (H) 65 - 99 mg/dL    Bun 42 (H) 8 - 22 mg/dL    Creatinine 1.94 (H) 0.50 - 1.40 mg/dL    Calcium 8.0 (L) 8.5 - 10.5 mg/dL    Anion Gap 18.0 (H) 7.0 - 16.0   ESTIMATED GFR    Collection Time: 09/22/23 10:35 AM   Result Value Ref Range    GFR (CKD-EPI) 26 (A) >60 mL/min/1.73 m 2   POCT glucose device results    Collection Time: 09/22/23 12:07 PM   Result Value Ref Range    POC Glucose, Blood 200 (H) 65 - 99 mg/dL   POCT activated clotting time device results    Collection Time: 09/22/23   1:36 PM   Result Value Ref Range    Istat Activated Clotting Time 143 (H) 74 - 137 sec   Lactic Acid    Collection Time: 09/22/23  5:30 PM   Result Value Ref Range    Lactic Acid 5.2 (HH) 0.5 - 2.0 mmol/L   Basic Metabolic Panel    Collection Time: 09/22/23  5:30 PM   Result Value Ref Range    Sodium 131 (L) 135 - 145 mmol/L    Potassium 3.9 3.6 - 5.5 mmol/L    Chloride 97 96 - 112 mmol/L    Co2 15 (L) 20 - 33 mmol/L    Glucose 222 (H) 65 - 99 mg/dL    Bun 45 (H) 8 - 22 mg/dL    Creatinine 2.40 (H) 0.50 - 1.40 mg/dL    Calcium 8.3 (L) 8.5 - 10.5 mg/dL    Anion Gap 19.0 (H) 7.0 - 16.0   CBC WITHOUT DIFFERENTIAL    Collection Time: 09/22/23  5:30 PM   Result Value Ref Range    WBC 21.2 (H) 4.8 - 10.8 K/uL    RBC 3.27 (L) 4.20 - 5.40 M/uL    Hemoglobin 9.5 (L) 12.0 - 16.0 g/dL    Hematocrit 27.8 (L) 37.0 - 47.0 %    MCV 85.0 81.4 - 97.8 fL    MCH 29.1 27.0 - 33.0 pg    MCHC 34.2 32.2 - 35.5 g/dL    RDW 46.5 35.9 - 50.0 fL    Platelet Count 161 (L) 164 - 446 K/uL    MPV 10.8 9.0 - 12.9 fL   ESTIMATED GFR    Collection Time: 09/22/23  5:30 PM   Result Value Ref Range    GFR (CKD-EPI) 20 (A) >60 mL/min/1.73 m 2   POCT activated clotting time device results    Collection Time: 09/22/23  5:31 PM   Result Value Ref Range    Istat Activated Clotting Time 149 (H) 74 - 137 sec   POCT glucose device results    Collection Time: 09/22/23  5:31 PM   Result Value Ref Range    POC Glucose, Blood 214 (H) 65 - 99 mg/dL   Lactic Acid    Collection Time: 09/22/23  9:45 PM   Result Value Ref Range    Lactic Acid 4.1 (HH) 0.5 - 2.0 mmol/L   HEMOGLOBIN AND HEMATOCRIT    Collection Time: 09/22/23  9:45 PM   Result Value Ref Range    Hemoglobin 9.2 (L) 12.0 - 16.0 g/dL    Hematocrit 27.0 (L) 37.0 - 47.0 %   POCT activated clotting time device results    Collection Time: 09/22/23  9:45 PM   Result Value Ref Range    Istat Activated Clotting Time 137 74 - 137 sec   POCT arterial blood gas device results    Collection Time: 09/22/23  9:52 PM    Result Value Ref Range    Ph 7.427 7.400 - 7.500    Pco2 26.5 26.0 - 37.0 mmHg    Po2 87 64 - 87 mmHg    Tco2 18 (L) 20 - 33 mmol/L    S02 97 93 - 99 %    Hco3 17.5 17.0 - 25.0 mmol/L    BE -6 (L) -4 - 3 mmol/L    Body Temp 36.9 C degrees    Ph Temp Billy 7.429 7.400 - 7.500    Pco2 Temp Co 26.4 26.0 - 37.0 mmHg    Po2 Temp Cor 87 64 - 87 mmHg    Specimen Arterial     DelSys Other     LPM 1 lpm   POCT venous blood gas device results    Collection Time: 09/22/23  9:58 PM   Result Value Ref Range    Ph 7.372 7.310 - 7.450    Pco2 34.6 (L) 41.0 - 51.0 mmHg    Po2 28 25 - 40 mmHg    Tco2 21 20 - 33 mmol/L    SO2 51 %    Hco3 20.1 (L) 24.0 - 28.0 mmol/L    BE -4 -4 - 3 mmol/L    Body Temp 36.9 C degrees    Ph Temp Correc 7.374 7.310 - 7.450    Pco2 Temp Billy 34.5 (L) 41.0 - 51.0 mmHg    Po2 Temp Corre 27 25 - 40 mmHg    Specimen Venous     DelSys Other     LPM 1 lpm       Cardiac Imaging and Procedures Review:    EKG and telemetry tracings personally reviewed    Impression and Medical Decision Making:  Principal Problem:    STEMI (ST elevation myocardial infarction) (HCC) (POA: Yes)  Active Problems:    Obesity (POA: Yes)    Acute renal failure superimposed on stage 3 chronic kidney disease (HCC) (POA: Yes)      Overview: Is staying hydrated.       Refusing further lab work.    Cardiogenic shock (HCC) (POA: Unknown)    Lactic acidosis (POA: Unknown)    Acute anemia (POA: Unknown)    Coronary artery disease involving native coronary artery of native heart (POA: Unknown)  Resolved Problems:    * No resolved hospital problems. *    Cardiogenic shock in the setting of acute coronary syndrome STEMI equivalent  She is here with her extended family at the bedside I actually know her  Dr. Vallejo because of his recent hospitalization.  She had told her family is her time to go which is very appropriate given her current presentation without options for improving her heart.    Continue Impella and inotrope support  until the family is ready to stop current support and informed them with turning down her Impella and inotropes she would succumb fairly quickly    I personally discussed her case with  Dr Chiquita Braun D.O.    No future appointments.    It is my pleasure to participate in the care of Ms. Beth.  Please do not hesitate to contact me with questions or concerns.    Kade Suraez MD PhD [facility]  Cardiologist [facility]    9/23/2023    Please note that this dictation was created using voice recognition software. There may be errors of grammar and possibly content I did not discover before finalizing the note.      Madison Beth is critically ill and she is at high risk for clinical deterioration, worsening vital organ dysfunction, and death without the above critical care interventions.  Critical care time 40 minutes due to cardiogenic shock and end of life decision making. No time overlap. Procedures were not included in this time. This time was spent at the bedside with the patient in continuous monitoring, evaluating the patient's chart, their labs,   imaging, physical exam and discussion with ICU as well as the bedside nurse, her  and children and formulating an assessment and plan.

## 2023-09-23 NOTE — PROGRESS NOTES
2140 Dr Molina and Dr. Miranda to bedside as patient has increased from 0.08 of levo at start of shift to 0.35mcg/kg/min upon return from CT.  Provided with most recent cardiac index numbers. Impella remains at P9 with no alarms, aortic placement signal correlating with art line.    500ml LR bolus ordered, labs drawn and sent, RT to draw ABG and MV-BG to calculate MOHAMUD    2215 Dr Miranda at bedside as bolus finished, patient currently on 0.5mcg/kg/min levo. Orders received to decrease dobutamine infusion rate to 15mcg/kg/min. No orders to start vaso at this point. To recheck of swan numbers 30 minutes after titration.    MOHAMUD numbers    CO:3.8  CI:1.9  Stroke volume:31  Colleen: 3.6     on impella 0.6    2350 Vaso arrived and started. Levo able to be titrated down quickly afterwards    0000 Dr Suarez cardiology updated on events of night, okay to repeat a 500 ml NS bolus over two hours. Target CVP 15.     0300 MD Suarez notified, down to P8 due to hemolysis present in urine. Updated on most recent swan numbers and CVP post bolus'

## 2023-09-23 NOTE — PROGRESS NOTES
Plan to transition to comfort care once more family arrives. Okay to stop taking cuff pressures per Dr. Braun. Will use impella for BP readings at this time.

## 2023-09-23 NOTE — PROGRESS NOTES
Received report and assumed patient care. Pt is on bed rest for impella device. Impella at P8. Levo vaso and dobutamine for hemodynamic support. Pt on low dose dex infusion, Rass -1. Heparin gtt verified.

## 2023-09-23 NOTE — PROGRESS NOTES
1755:   Dr. Molina notified of drop in hgb to 9.5 along with continued back pain. Pt assessed multiple times throughout the day for external evidence of RP bleed with none noted. No flank or back bruising however, hematoma continues to be present at R groin impella insertion site. Area remains unchanged throughout the day. CT abd ordered to rule out for bleed.     1815:  This RN called CT to schedule time for scan. Unable to fit in at this time however, scheduled to take pt at 2030.

## 2023-09-23 NOTE — PROGRESS NOTES
Shift change report received from HILDA Bates at bedside. Impella positioning confirmed at 91, swan placement confirmed between 70-75. Right groin hematoma noted, and plan to go to CT at 2030.     Neuro assessment performed with both RN's patient has equal strength and sensation in both upper and lower extremities.  AO x1/2 (Self & place) per HILDA Bates, patient progressively more confused throughout day.    Drips verified.

## 2023-09-23 NOTE — DISCHARGE SUMMARY
[facility] Death Summary      Attending: Chiquita Braun D.o.  Senior Resident: Dr. Miller  Call Back Contact Number: [phone]    Admission Date  9/21/2023    Cause of Death  Cardiac arrest due to cardiogenic shock due to ST elevation myocardial infarction and ischemic cardiomyopathy.    Comorbid Conditions at the Time of Death  Principal Problem:    STEMI (ST elevation myocardial infarction) (HCC) (POA: Yes)  Active Problems:    Obesity (POA: Yes)    Acute renal failure superimposed on stage 3 chronic kidney disease (HCC) (POA: Yes)      Overview: Is staying hydrated.       Refusing further lab work.    Cardiogenic shock (HCC) (POA: Unknown)    Lactic acidosis (POA: Unknown)    Acute anemia (POA: Unknown)    Coronary artery disease involving native coronary artery of native heart (POA: Unknown)    Hyperglycemia (POA: Unknown)  Resolved Problems:    * No resolved hospital problems. *      History of Presenting Illness and Hospital Course  Madison Beth is a 77 y.o. female with a medical history significant for obesity, hypertension, chronic kidney disease, GERD, remote breast cancer with right mastectomy in 1990 who was admitted 9/21/2023 with 1 week of chest pain and dyspnea.  Code STEMI was activated upon arrival and was taken urgently to left heart catheterization.  She was found to have cardiogenic shock with severe ischemic cardiomyopathy and diffuse multivessel CAD.  She received PCI to the left main and LAD with SHAWN and angioplasty to the circumflex.  An Impella device was placed and she was started on dobutamine gtt and heparin gtt while she was in the cath lab and she was admitted to the ICU for further management.  Upon transfer she was in atrial fibrillation, a PA catheter was in place and used to titrate dobutamine and impella settings.  She did not require intubation following the procedure.  She required multiple vasopressors, along with the impella to maintain adequate blood  pressures and cardiac index, and was showing only mild improvement.  On AM of  patient is stating that she doesn't want anything more done, although she was A+Ox1 at that time.  After family discussion on  her family affirms that she would likely not want any escalation of care and would want to pursue comfort care measures in this situation.  Supportive medications were discontinued at this time and she received medications to make her more comfortable.  Patient  at 1436.    Consultants  cardiology and critical care    Procedures  :  Left heart catheterization with SHAWN placement.  With impella and pulmonary artery catheter placed  :  arterial line    Death Date: 23   Death Time: 1436         Pronounced By (RN1): Angela Ibarra  Pronounced By (RN2): Nusrat Broussard

## 2023-09-23 NOTE — PROGRESS NOTES
Pt transitioned to comfort care. Continuous infusions stopped, impella turned off and prn comfort measures given.

## 2023-09-23 NOTE — PROGRESS NOTES
[facility] Follow-up Consult Note  Date of note:    9/23/2023          Patient ID:  Name:   Madison Beth   YOB: 1945  Age:   77 y.o.  female   MRN:   [id number]    CC SOB CP      Interim Events:  Patient relatively stable overnight on pressor support Impella has reduced urine output with blunted urine as well she is awake and conversant family is at the bedside    No pain but has had been coughing spells that was noted during her exam as well  ROS  No NV, , No dizziness   All other review of systems reviewed and negative.    Past medical, surgical, social, and family history reviewed and unchanged from admission except as noted in assessment and plan.    Medications: Reviewed in MAR  Current Facility-Administered Medications   Medication Dose Frequency Provider Last Rate Last Admin    nitroglycerin 50 mg in D5W 250 ml infusion  0-200 mcg/min Continuous Dann JOSS Campo   Stopped at 09/22/23 0600    furosemide (Lasix) injection 40 mg  40 mg BID DIURETIC Nicolas Collier M.D.   40 mg at 09/22/23 0546    atorvastatin (Lipitor) tablet 40 mg  40 mg Q EVENING Sudeep You M.D.   40 mg at 09/22/23 1713    norepinephrine (Levophed) 8 mg in 250 mL NS infusion (premix)  0-1 mcg/kg/min (Ideal) Continuous Sudeep You M.D. 15.4 mL/hr at 09/22/23 1806 0.18 mcg/kg/min at 09/22/23 1806    insulin regular (HumuLIN R,NovoLIN R) injection  1-6 Units Q6HRS DAVIS FoyORian   2 Units at 09/22/23 1734    And    dextrose 50% (D50W) injection 25 g  25 g Q15 MIN PRN Chiquita Braun D.O.        LORazepam (Ativan) tablet 0.5 mg  0.5 mg Q6HRS PRN Nicolas Collier M.D.   0.5 mg at 09/22/23 1013    acetaminophen (Tylenol) tablet 1,000 mg  1,000 mg Q6HRS PRN Nicolas Collier M.D.   1,000 mg at 09/22/23 2015    dexmedetomidine (PRECEDEX) 400 mcg/100mL NS premix infusion  0.1-1.5 mcg/kg/hr (Ideal) Continuous Chiquita Braun D.O. 2.3 mL/hr at 09/22/23 1611 0.2 mcg/kg/hr at 09/22/23 1611    oxyCODONE  immediate-release (Roxicodone) tablet 2.5 mg  2.5 mg Q4HRS PRN Gene Castillo M.D.   2.5 mg at 09/22/23 1713    vasopressin (Vasostrict) 20 Units in  mL Infusion  0.03 Units/min Continuous Omi Miranda D.O. 9 mL/hr at 09/22/23 2351 0.03 Units/min at 09/22/23 2351    DOBUTamine (Dobutrex) 1 mg/1 mL premix infusion  15 mcg/kg/min (Ideal) Continuous Gene Molina Jr. D.ORian        aspirin EC tablet 81 mg  81 mg DAILY Terrance Adame M.D.   81 mg at 09/22/23 0546    clopidogrel (Plavix) tablet 75 mg  75 mg DAILY Terrance Adame M.D.   75 mg at 09/22/23 0547    heparin infusion 25,000 Units in 500 mL 0.45% NACL  0-1,000 Units/hr Continuous Terrance Adame M.D. 20.3 mL/hr at 09/22/23 2312 1,015 Units/hr at 09/22/23 2312    And    heparin 25 units/mL in 500mL D5W infusion (for use with IMPELLA Pump)   Units/hr Continuous Terrance Adame M.D. 3.4 mL/hr at 09/22/23 2310 85 Units/hr at 09/22/23 2310   Last reviewed on 6/3/2022 10:57 AM by James Oshea M.D.   Allergies   Allergen Reactions    Sulfa Drugs        Physical Exam  Body mass index is 38.36 kg/m². BP (!) 81/54   Pulse (!) 117   Temp 36.9 °C (98.4 °F) (Core)   Resp 19   Ht 1.524 m (5')   Wt 89.1 kg (196 lb 6.9 oz)   SpO2 95%    Vitals:    09/22/23 2130 09/22/23 2145 09/22/23 2200 09/22/23 2215   BP: (!) 95/38 (!) 73/38 (!) 76/50 (!) 81/54   Pulse: (!) 126 98 (!) 106 (!) 117   Resp: 20 (!) 21 20 19   Temp:   36.9 °C (98.4 °F)    TempSrc:   Core    SpO2: 96% 93% 94% 95%   Weight:       Height:        Oxygen Therapy:  Pulse Oximetry: 95 %, O2 (LPM): 1, O2 Delivery Device: Silicone Nasal Cannula    General: Appears ill  Eyes: nl conjunctiva  ENT: OP clear  Neck: Central access clean dry intact  Lungs: normal respiratory effort  Heart: Irregular with A-fib heart rate 130s  EXT [mild edema bilateral lower extremities.  no cyanosis hematoma in the right groin with obesity  Abdomen: Obesity  Neurological: No focal deficits  Psychiatric: Appropriate  affect,   Skin: Warm extremities    Labs (personally reviewed and notable for):   Recent Results (from the past 24 hour(s))   CBC without Differential    Collection Time: 09/22/23 12:40 AM   Result Value Ref Range    WBC 14.3 (H) 4.8 - 10.8 K/uL    RBC 4.45 4.20 - 5.40 M/uL    Hemoglobin 12.4 12.0 - 16.0 g/dL    Hematocrit 38.0 37.0 - 47.0 %    MCV 85.4 81.4 - 97.8 fL    MCH 27.9 27.0 - 33.0 pg    MCHC 32.6 32.2 - 35.5 g/dL    RDW 46.8 35.9 - 50.0 fL    Platelet Count 244 164 - 446 K/uL    MPV 11.0 9.0 - 12.9 fL   Comp Metabolic Panel (CMP)    Collection Time: 09/22/23 12:40 AM   Result Value Ref Range    Sodium 135 135 - 145 mmol/L    Potassium 4.3 3.6 - 5.5 mmol/L    Chloride 100 96 - 112 mmol/L    Co2 20 20 - 33 mmol/L    Anion Gap 15.0 7.0 - 16.0    Glucose 200 (H) 65 - 99 mg/dL    Bun 35 (H) 8 - 22 mg/dL    Creatinine 1.62 (H) 0.50 - 1.40 mg/dL    Calcium 8.0 (L) 8.5 - 10.5 mg/dL    Correct Calcium 8.6 8.5 - 10.5 mg/dL    AST(SGOT) 130 (H) 12 - 45 U/L    ALT(SGPT) 70 (H) 2 - 50 U/L    Alkaline Phosphatase 114 (H) 30 - 99 U/L    Total Bilirubin 0.9 0.1 - 1.5 mg/dL    Albumin 3.2 3.2 - 4.9 g/dL    Total Protein 6.2 6.0 - 8.2 g/dL    Globulin 3.0 1.9 - 3.5 g/dL    A-G Ratio 1.1 g/dL   Magnesium    Collection Time: 09/22/23 12:40 AM   Result Value Ref Range    Magnesium 2.0 1.5 - 2.5 mg/dL   proBrain Natriuretic Peptide, NT    Collection Time: 09/22/23 12:40 AM   Result Value Ref Range    NT-proBNP 51791 (H) 0 - 125 pg/mL   Prothrombin time (INR)    Collection Time: 09/22/23 12:40 AM   Result Value Ref Range    PT 20.0 (H) 12.0 - 14.6 sec    INR 1.68 (H) 0.87 - 1.13   LACTIC ACID    Collection Time: 09/22/23 12:40 AM   Result Value Ref Range    Lactic Acid 3.2 (H) 0.5 - 2.0 mmol/L   ESTIMATED GFR    Collection Time: 09/22/23 12:40 AM   Result Value Ref Range    GFR (CKD-EPI) 32 (A) >60 mL/min/1.73 m 2   EKG    Collection Time: 09/22/23  1:37 AM   Result Value Ref Range    Report       [facility]    Test  Date:  2023  Pt Name:    ROQUE REN              Department: 161  MRN:        [id number]                      Room:       T625  Gender:     Female                       Technician: [facility]  :        1945                   Requested By:ALEC ARBOLEDA  Order #:    [id number]                    Reading MD:    Measurements  Intervals                                Axis  Rate:       133                          P:          0  DC:         0                            QRS:        -85  QRSD:       125                          T:          132  QT:         350  QTc:        521    Interpretive Statements  Atrial fibrillation  Nonspecific IVCD with LAD  LVH with secondary repolarization abnormality  Probable inferior infarct, recent  Probable anterior infarct, age indeterminate  Compared to ECG 2010 13:26:39  Intraventricular conduction delay now present  Left ventricular hypertrophy now present  Early repolarization now present  Myocardial infarct fin ding now present  Sinus rhythm no longer present  T-wave abnormality no longer present     EKG STAT    Collection Time: 23  1:37 AM   Result Value Ref Range    Report       [facility]    Test Date:  2023  Pt Name:    ROQUE REN              Department: 161  MRN:        [id number]                      Room:       25  Gender:     Female                       Technician: [facility]  :        1945                   Requested By:DANN MARK  Order #:    [id number]                    Reading MD: Dann Mark MD    Measurements  Intervals                                Axis  Rate:       133                          P:          0  DC:         0                            QRS:        -85  QRSD:       125                          T:          132  QT:         350  QTc:        521    Interpretive Statements  Atrial fibrillation  Nonspecific IVCD with LAD  LVH with secondary repolarization abnormality  Probable inferior infarct,  recent  Probable anterior infarct, age indeterminate  Electronically Signed On 09- 06:31:26 PDT by Dann Campo MD     POCT activated clotting time device results    Collection Time: 09/22/23  1:53 AM   Result Value Ref Range    Istat Activated Clotting Time 203 (H) 74 - 137 sec   POCT arterial blood gas device results    Collection Time: 09/22/23  2:01 AM   Result Value Ref Range    Ph 7.464 7.400 - 7.500    Pco2 24.0 (L) 26.0 - 37.0 mmHg    Po2 76 64 - 87 mmHg    Tco2 18 (L) 20 - 33 mmol/L    S02 96 93 - 99 %    Hco3 17.2 17.0 - 25.0 mmol/L    BE -4 -4 - 3 mmol/L    Body Temp 37.0 C degrees    Ph Temp Billy 7.464 7.400 - 7.500    Pco2 Temp Co 24.0 (L) 26.0 - 37.0 mmHg    Po2 Temp Cor 76 64 - 87 mmHg    Specimen Arterial     DelSys Other    POCT lactate device results    Collection Time: 09/22/23  2:01 AM   Result Value Ref Range    iStat Lactate 3.6 (H) 0.5 - 2.0 mmol/L   Basic Metabolic Panel (BMP)    Collection Time: 09/22/23  2:28 AM   Result Value Ref Range    Sodium 135 135 - 145 mmol/L    Potassium 4.1 3.6 - 5.5 mmol/L    Chloride 100 96 - 112 mmol/L    Co2 15 (L) 20 - 33 mmol/L    Glucose 230 (H) 65 - 99 mg/dL    Bun 37 (H) 8 - 22 mg/dL    Creatinine 1.75 (H) 0.50 - 1.40 mg/dL    Calcium 7.9 (L) 8.5 - 10.5 mg/dL    Anion Gap 20.0 (H) 7.0 - 16.0   CBC without differential    Collection Time: 09/22/23  2:28 AM   Result Value Ref Range    WBC 16.0 (H) 4.8 - 10.8 K/uL    RBC 3.97 (L) 4.20 - 5.40 M/uL    Hemoglobin 11.1 (L) 12.0 - 16.0 g/dL    Hematocrit 33.8 (L) 37.0 - 47.0 %    MCV 85.1 81.4 - 97.8 fL    MCH 28.0 27.0 - 33.0 pg    MCHC 32.8 32.2 - 35.5 g/dL    RDW 47.5 35.9 - 50.0 fL    Platelet Count 249 164 - 446 K/uL    MPV 11.4 9.0 - 12.9 fL   Bilirubin (Direct)    Collection Time: 09/22/23  2:28 AM   Result Value Ref Range    Direct Bilirubin <0.2 0.1 - 0.5 mg/dL   Bilirubin (Indirect)    Collection Time: 09/22/23  2:28 AM   Result Value Ref Range    Indirect Bilirubin see below 0.0 - 1.0 mg/dL    Bilirubin Total    Collection Time: 09/22/23  2:28 AM   Result Value Ref Range    Total Bilirubin 1.0 0.1 - 1.5 mg/dL   LDH    Collection Time: 09/22/23  2:28 AM   Result Value Ref Range    LDH Total 741 (H) 107 - 266 U/L   ESTIMATED GFR    Collection Time: 09/22/23  2:28 AM   Result Value Ref Range    GFR (CKD-EPI) 29 (A) >60 mL/min/1.73 m 2   POCT activated clotting time device results    Collection Time: 09/22/23  3:08 AM   Result Value Ref Range    Istat Activated Clotting Time 167 (H) 74 - 137 sec   Lactic Acid    Collection Time: 09/22/23  5:55 AM   Result Value Ref Range    Lactic Acid 3.5 (H) 0.5 - 2.0 mmol/L   POCT activated clotting time device results    Collection Time: 09/22/23  7:11 AM   Result Value Ref Range    Istat Activated Clotting Time 161 (H) 74 - 137 sec   EC-ECHOCARDIOGRAM LTD W/O CONT    Collection Time: 09/22/23  9:20 AM   Result Value Ref Range    Left Ventrical Ejection Fraction 20    Lactic Acid    Collection Time: 09/22/23 10:35 AM   Result Value Ref Range    Lactic Acid 5.0 (HH) 0.5 - 2.0 mmol/L   HEMOGLOBIN AND HEMATOCRIT    Collection Time: 09/22/23 10:35 AM   Result Value Ref Range    Hemoglobin 10.3 (L) 12.0 - 16.0 g/dL    Hematocrit 31.0 (L) 37.0 - 47.0 %   Basic Metabolic Panel    Collection Time: 09/22/23 10:35 AM   Result Value Ref Range    Sodium 133 (L) 135 - 145 mmol/L    Potassium 3.8 3.6 - 5.5 mmol/L    Chloride 99 96 - 112 mmol/L    Co2 16 (L) 20 - 33 mmol/L    Glucose 217 (H) 65 - 99 mg/dL    Bun 42 (H) 8 - 22 mg/dL    Creatinine 1.94 (H) 0.50 - 1.40 mg/dL    Calcium 8.0 (L) 8.5 - 10.5 mg/dL    Anion Gap 18.0 (H) 7.0 - 16.0   ESTIMATED GFR    Collection Time: 09/22/23 10:35 AM   Result Value Ref Range    GFR (CKD-EPI) 26 (A) >60 mL/min/1.73 m 2   POCT glucose device results    Collection Time: 09/22/23 12:07 PM   Result Value Ref Range    POC Glucose, Blood 200 (H) 65 - 99 mg/dL   POCT activated clotting time device results    Collection Time: 09/22/23  1:36 PM    Result Value Ref Range    Istat Activated Clotting Time 143 (H) 74 - 137 sec   Lactic Acid    Collection Time: 09/22/23  5:30 PM   Result Value Ref Range    Lactic Acid 5.2 (HH) 0.5 - 2.0 mmol/L   Basic Metabolic Panel    Collection Time: 09/22/23  5:30 PM   Result Value Ref Range    Sodium 131 (L) 135 - 145 mmol/L    Potassium 3.9 3.6 - 5.5 mmol/L    Chloride 97 96 - 112 mmol/L    Co2 15 (L) 20 - 33 mmol/L    Glucose 222 (H) 65 - 99 mg/dL    Bun 45 (H) 8 - 22 mg/dL    Creatinine 2.40 (H) 0.50 - 1.40 mg/dL    Calcium 8.3 (L) 8.5 - 10.5 mg/dL    Anion Gap 19.0 (H) 7.0 - 16.0   CBC WITHOUT DIFFERENTIAL    Collection Time: 09/22/23  5:30 PM   Result Value Ref Range    WBC 21.2 (H) 4.8 - 10.8 K/uL    RBC 3.27 (L) 4.20 - 5.40 M/uL    Hemoglobin 9.5 (L) 12.0 - 16.0 g/dL    Hematocrit 27.8 (L) 37.0 - 47.0 %    MCV 85.0 81.4 - 97.8 fL    MCH 29.1 27.0 - 33.0 pg    MCHC 34.2 32.2 - 35.5 g/dL    RDW 46.5 35.9 - 50.0 fL    Platelet Count 161 (L) 164 - 446 K/uL    MPV 10.8 9.0 - 12.9 fL   ESTIMATED GFR    Collection Time: 09/22/23  5:30 PM   Result Value Ref Range    GFR (CKD-EPI) 20 (A) >60 mL/min/1.73 m 2   POCT activated clotting time device results    Collection Time: 09/22/23  5:31 PM   Result Value Ref Range    Istat Activated Clotting Time 149 (H) 74 - 137 sec   POCT glucose device results    Collection Time: 09/22/23  5:31 PM   Result Value Ref Range    POC Glucose, Blood 214 (H) 65 - 99 mg/dL   Lactic Acid    Collection Time: 09/22/23  9:45 PM   Result Value Ref Range    Lactic Acid 4.1 (HH) 0.5 - 2.0 mmol/L   HEMOGLOBIN AND HEMATOCRIT    Collection Time: 09/22/23  9:45 PM   Result Value Ref Range    Hemoglobin 9.2 (L) 12.0 - 16.0 g/dL    Hematocrit 27.0 (L) 37.0 - 47.0 %   POCT activated clotting time device results    Collection Time: 09/22/23  9:45 PM   Result Value Ref Range    Istat Activated Clotting Time 137 74 - 137 sec   POCT arterial blood gas device results    Collection Time: 09/22/23  9:52 PM   Result  Value Ref Range    Ph 7.427 7.400 - 7.500    Pco2 26.5 26.0 - 37.0 mmHg    Po2 87 64 - 87 mmHg    Tco2 18 (L) 20 - 33 mmol/L    S02 97 93 - 99 %    Hco3 17.5 17.0 - 25.0 mmol/L    BE -6 (L) -4 - 3 mmol/L    Body Temp 36.9 C degrees    Ph Temp Billy 7.429 7.400 - 7.500    Pco2 Temp Co 26.4 26.0 - 37.0 mmHg    Po2 Temp Cor 87 64 - 87 mmHg    Specimen Arterial     DelSys Other     LPM 1 lpm   POCT venous blood gas device results    Collection Time: 09/22/23  9:58 PM   Result Value Ref Range    Ph 7.372 7.310 - 7.450    Pco2 34.6 (L) 41.0 - 51.0 mmHg    Po2 28 25 - 40 mmHg    Tco2 21 20 - 33 mmol/L    SO2 51 %    Hco3 20.1 (L) 24.0 - 28.0 mmol/L    BE -4 -4 - 3 mmol/L    Body Temp 36.9 C degrees    Ph Temp Correc 7.374 7.310 - 7.450    Pco2 Temp Billy 34.5 (L) 41.0 - 51.0 mmHg    Po2 Temp Corre 27 25 - 40 mmHg    Specimen Venous     DelSys Other     LPM 1 lpm       Cardiac Imaging and Procedures Review:    EKG and telemetry tracings personally reviewed    Angiogram images reviewed personally she had successful stenting left main but has severe disease in the mid vessels in all 3    Impression and Medical Decision Making:  Principal Problem:    STEMI (ST elevation myocardial infarction) (HCC) (POA: Yes)  Active Problems:    Obesity (POA: Yes)    Acute renal failure superimposed on stage 3 chronic kidney disease (HCC) (POA: Yes)      Overview: Is staying hydrated.       Refusing further lab work.    Cardiogenic shock (HCC) (POA: Unknown)    Lactic acidosis (POA: Unknown)    Acute anemia (POA: Unknown)    Coronary artery disease involving native coronary artery of native heart (POA: Unknown)  Resolved Problems:    * No resolved hospital problems. *    STEMI status post successful stenting to her left main with Impella with cardiogenic shock on presentation and continues  Impella and inotropes  Antiplatelet  Statin  No beta-blockers given shock  EF severely reduced 20% on echo      Her presentation and illness is quite  alarming especially at this age we are continuing full aggressive support she is not a candidate for further intervention of her coronary arteries or LVAD or other treatments given her presentation and age.  Discussed with her and her family at the bedside the seriousness of this presentation and that we are doing everything possible    I personally discussed her case with  Dr Chiquita Braun D.O.    No future appointments.    It is my pleasure to participate in the care of Ms. Beth.  Please do not hesitate to contact me with questions or concerns.    Kade Suarez MD PhD [facility]  Cardiologist [facility]    9/23/2023    Please note that this dictation was created using voice recognition software. There may be errors of grammar and possibly content I did not discover before finalizing the note.      Madison Beth is critically ill and she is at high risk for clinical deterioration, worsening vital organ dysfunction, and death without the above critical care interventions.  Critical care time 50 minutes due to shock. No time overlap. Procedures were not included in this time. This time was spent at the bedside with the patient in continuous monitoring, evaluating the patient's chart, their labs,   imaging, physical exam and discussion with ICU team as well as the bedside nurse, and family and formulating an assessment and plan.   Date of service September 22, 2023

## 2023-09-24 LAB — EKG IMPRESSION: NORMAL

## 2023-10-16 NOTE — PROGRESS NOTES
PROCEDURE PERFORMED: Impella repositioning under direct echocardiographic guidance    DESCRIPTION OF PROCEDURE:  Under direct echocardiographic evaluation the device was found to be in the left ventricular cavity.  Under direct echocardiographic guidance the device was withdrawn to 91 cm and confirmed in good placement at the end of the procedure.  Final placement marker 91 cm at the hub.  No complications.    Dann Campo M.D.